# Patient Record
Sex: FEMALE | Race: WHITE | NOT HISPANIC OR LATINO | Employment: OTHER | URBAN - METROPOLITAN AREA
[De-identification: names, ages, dates, MRNs, and addresses within clinical notes are randomized per-mention and may not be internally consistent; named-entity substitution may affect disease eponyms.]

---

## 2020-03-26 ENCOUNTER — HOSPITAL ENCOUNTER (INPATIENT)
Facility: HOSPITAL | Age: 85
LOS: 4 days | Discharge: NURSING, REHAB OR LONG-TERM CARE | DRG: 291 | End: 2020-03-30
Attending: FAMILY MEDICINE | Admitting: FAMILY MEDICINE
Payer: MEDICARE

## 2020-03-26 DIAGNOSIS — I50.9 ACUTE CHF (CONGESTIVE HEART FAILURE): ICD-10-CM

## 2020-03-26 DIAGNOSIS — R06.02 SOB (SHORTNESS OF BREATH): ICD-10-CM

## 2020-03-26 DIAGNOSIS — I50.9 ACUTE ON CHRONIC CONGESTIVE HEART FAILURE, UNSPECIFIED HEART FAILURE TYPE: ICD-10-CM

## 2020-03-26 DIAGNOSIS — J18.9 PNEUMONIA OF RIGHT LOWER LOBE DUE TO INFECTIOUS ORGANISM: Primary | ICD-10-CM

## 2020-03-26 PROBLEM — E87.1 HYPONATREMIA: Status: ACTIVE | Noted: 2020-03-26

## 2020-03-26 PROBLEM — E87.5 HYPERKALEMIA: Status: ACTIVE | Noted: 2020-03-26

## 2020-03-26 PROBLEM — R79.89 ELEVATED SERUM CREATININE: Status: ACTIVE | Noted: 2020-03-26

## 2020-03-26 PROBLEM — J96.01 ACUTE HYPOXEMIC RESPIRATORY FAILURE: Status: ACTIVE | Noted: 2020-03-26

## 2020-03-26 LAB
ALBUMIN SERPL BCP-MCNC: 3.9 G/DL (ref 3.5–5.2)
ALP SERPL-CCNC: 85 U/L (ref 55–135)
ALT SERPL W/O P-5'-P-CCNC: 40 U/L (ref 10–44)
ANION GAP SERPL CALC-SCNC: 14 MMOL/L (ref 8–16)
AST SERPL-CCNC: 57 U/L (ref 10–40)
BASOPHILS # BLD AUTO: 0.03 K/UL (ref 0–0.2)
BASOPHILS NFR BLD: 0.4 % (ref 0–1.9)
BILIRUB SERPL-MCNC: 1 MG/DL (ref 0.1–1)
BNP SERPL-MCNC: 2945 PG/ML (ref 0–99)
BUN SERPL-MCNC: 32 MG/DL (ref 10–30)
CALCIUM SERPL-MCNC: 8.8 MG/DL (ref 8.7–10.5)
CHLORIDE SERPL-SCNC: 96 MMOL/L (ref 95–110)
CO2 SERPL-SCNC: 20 MMOL/L (ref 23–29)
CREAT SERPL-MCNC: 1.6 MG/DL (ref 0.5–1.4)
DIFFERENTIAL METHOD: ABNORMAL
EOSINOPHIL # BLD AUTO: 0 K/UL (ref 0–0.5)
EOSINOPHIL NFR BLD: 0.1 % (ref 0–8)
ERYTHROCYTE [DISTWIDTH] IN BLOOD BY AUTOMATED COUNT: 17.2 % (ref 11.5–14.5)
EST. GFR  (AFRICAN AMERICAN): 31.6 ML/MIN/1.73 M^2
EST. GFR  (NON AFRICAN AMERICAN): 27.4 ML/MIN/1.73 M^2
GLUCOSE SERPL-MCNC: 138 MG/DL (ref 70–110)
HCT VFR BLD AUTO: 38.2 % (ref 37–48.5)
HGB BLD-MCNC: 12.2 G/DL (ref 12–16)
IMM GRANULOCYTES # BLD AUTO: 0.03 K/UL (ref 0–0.04)
IMM GRANULOCYTES NFR BLD AUTO: 0.4 % (ref 0–0.5)
INFLUENZA A, MOLECULAR: NEGATIVE
INFLUENZA B, MOLECULAR: NEGATIVE
LYMPHOCYTES # BLD AUTO: 1.1 K/UL (ref 1–4.8)
LYMPHOCYTES NFR BLD: 14.2 % (ref 18–48)
MCH RBC QN AUTO: 28.6 PG (ref 27–31)
MCHC RBC AUTO-ENTMCNC: 31.9 G/DL (ref 32–36)
MCV RBC AUTO: 90 FL (ref 82–98)
MONOCYTES # BLD AUTO: 0.5 K/UL (ref 0.3–1)
MONOCYTES NFR BLD: 6.2 % (ref 4–15)
NEUTROPHILS # BLD AUTO: 5.9 K/UL (ref 1.8–7.7)
NEUTROPHILS NFR BLD: 78.7 % (ref 38–73)
NRBC BLD-RTO: 0 /100 WBC
PLATELET # BLD AUTO: 152 K/UL (ref 150–350)
PMV BLD AUTO: 12.1 FL (ref 9.2–12.9)
POTASSIUM SERPL-SCNC: 5.7 MMOL/L (ref 3.5–5.1)
PROT SERPL-MCNC: 6.4 G/DL (ref 6–8.4)
RBC # BLD AUTO: 4.26 M/UL (ref 4–5.4)
SODIUM SERPL-SCNC: 130 MMOL/L (ref 136–145)
SPECIMEN SOURCE: NORMAL
WBC # BLD AUTO: 7.47 K/UL (ref 3.9–12.7)

## 2020-03-26 PROCEDURE — 71045 X-RAY EXAM CHEST 1 VIEW: CPT | Mod: 26,,, | Performed by: RADIOLOGY

## 2020-03-26 PROCEDURE — 36415 COLL VENOUS BLD VENIPUNCTURE: CPT

## 2020-03-26 PROCEDURE — 99222 1ST HOSP IP/OBS MODERATE 55: CPT | Mod: AI,,, | Performed by: FAMILY MEDICINE

## 2020-03-26 PROCEDURE — 96360 HYDRATION IV INFUSION INIT: CPT

## 2020-03-26 PROCEDURE — U0002 COVID-19 LAB TEST NON-CDC: HCPCS

## 2020-03-26 PROCEDURE — 71045 X-RAY EXAM CHEST 1 VIEW: CPT | Mod: TC,FY

## 2020-03-26 PROCEDURE — 93010 EKG 12-LEAD: ICD-10-PCS | Mod: ,,, | Performed by: INTERNAL MEDICINE

## 2020-03-26 PROCEDURE — 63600175 PHARM REV CODE 636 W HCPCS: Performed by: FAMILY MEDICINE

## 2020-03-26 PROCEDURE — 85025 COMPLETE CBC W/AUTO DIFF WBC: CPT

## 2020-03-26 PROCEDURE — 99222 PR INITIAL HOSPITAL CARE,LEVL II: ICD-10-PCS | Mod: AI,,, | Performed by: FAMILY MEDICINE

## 2020-03-26 PROCEDURE — 93005 ELECTROCARDIOGRAM TRACING: CPT

## 2020-03-26 PROCEDURE — 83880 ASSAY OF NATRIURETIC PEPTIDE: CPT

## 2020-03-26 PROCEDURE — 21400001 HC TELEMETRY ROOM

## 2020-03-26 PROCEDURE — 80053 COMPREHEN METABOLIC PANEL: CPT

## 2020-03-26 PROCEDURE — 11000001 HC ACUTE MED/SURG PRIVATE ROOM

## 2020-03-26 PROCEDURE — 93010 ELECTROCARDIOGRAM REPORT: CPT | Mod: ,,, | Performed by: INTERNAL MEDICINE

## 2020-03-26 PROCEDURE — 99285 EMERGENCY DEPT VISIT HI MDM: CPT | Mod: 25

## 2020-03-26 PROCEDURE — 87502 INFLUENZA DNA AMP PROBE: CPT

## 2020-03-26 PROCEDURE — 71045 XR CHEST AP PORTABLE: ICD-10-PCS | Mod: 26,,, | Performed by: RADIOLOGY

## 2020-03-26 RX ORDER — FUROSEMIDE 20 MG/1
20 TABLET ORAL
Status: ON HOLD | COMMUNITY
End: 2020-03-30 | Stop reason: SDUPTHER

## 2020-03-26 RX ORDER — ATORVASTATIN CALCIUM 40 MG/1
40 TABLET, FILM COATED ORAL DAILY
COMMUNITY

## 2020-03-26 RX ORDER — ATENOLOL 25 MG/1
25 TABLET ORAL DAILY
COMMUNITY

## 2020-03-26 RX ORDER — MORPHINE SULFATE 4 MG/ML
1 INJECTION, SOLUTION INTRAMUSCULAR; INTRAVENOUS EVERY 4 HOURS PRN
Status: DISCONTINUED | OUTPATIENT
Start: 2020-03-26 | End: 2020-03-30 | Stop reason: HOSPADM

## 2020-03-26 RX ORDER — ONDANSETRON 2 MG/ML
4 INJECTION INTRAMUSCULAR; INTRAVENOUS
Status: COMPLETED | OUTPATIENT
Start: 2020-03-26 | End: 2020-03-26

## 2020-03-26 RX ORDER — ACETAMINOPHEN 325 MG/1
650 TABLET ORAL EVERY 4 HOURS PRN
Status: DISCONTINUED | OUTPATIENT
Start: 2020-03-26 | End: 2020-03-30 | Stop reason: HOSPADM

## 2020-03-26 RX ORDER — SODIUM CHLORIDE 0.9 % (FLUSH) 0.9 %
10 SYRINGE (ML) INJECTION
Status: DISCONTINUED | OUTPATIENT
Start: 2020-03-26 | End: 2020-03-30 | Stop reason: HOSPADM

## 2020-03-26 RX ORDER — ISOSORBIDE MONONITRATE 30 MG/1
15 TABLET, EXTENDED RELEASE ORAL DAILY
COMMUNITY

## 2020-03-26 RX ORDER — POTASSIUM CHLORIDE 750 MG/1
10 TABLET, EXTENDED RELEASE ORAL ONCE
COMMUNITY

## 2020-03-26 RX ORDER — ONDANSETRON 2 MG/ML
4 INJECTION INTRAMUSCULAR; INTRAVENOUS EVERY 8 HOURS PRN
Status: DISCONTINUED | OUTPATIENT
Start: 2020-03-26 | End: 2020-03-30 | Stop reason: HOSPADM

## 2020-03-26 RX ORDER — CITALOPRAM 10 MG/1
10 TABLET ORAL DAILY
COMMUNITY

## 2020-03-26 RX ORDER — OMEPRAZOLE 20 MG/1
20 CAPSULE, DELAYED RELEASE ORAL DAILY
COMMUNITY

## 2020-03-26 RX ORDER — LOSARTAN POTASSIUM 50 MG/1
50 TABLET ORAL DAILY
COMMUNITY

## 2020-03-26 RX ORDER — SODIUM CHLORIDE 9 MG/ML
1000 INJECTION, SOLUTION INTRAVENOUS
Status: COMPLETED | OUTPATIENT
Start: 2020-03-26 | End: 2020-03-26

## 2020-03-26 RX ORDER — FUROSEMIDE 10 MG/ML
60 INJECTION INTRAMUSCULAR; INTRAVENOUS
Status: COMPLETED | OUTPATIENT
Start: 2020-03-26 | End: 2020-03-26

## 2020-03-26 RX ORDER — ONDANSETRON 4 MG/1
4 TABLET, ORALLY DISINTEGRATING ORAL EVERY 6 HOURS PRN
Status: DISCONTINUED | OUTPATIENT
Start: 2020-03-26 | End: 2020-03-30 | Stop reason: HOSPADM

## 2020-03-26 RX ORDER — MONTELUKAST SODIUM 10 MG/1
10 TABLET ORAL NIGHTLY
COMMUNITY

## 2020-03-26 RX ORDER — LEVOTHYROXINE SODIUM 75 UG/1
75 TABLET ORAL
COMMUNITY

## 2020-03-26 RX ORDER — AMLODIPINE BESYLATE 2.5 MG/1
2.5 TABLET ORAL DAILY
COMMUNITY

## 2020-03-26 RX ORDER — FUROSEMIDE 10 MG/ML
40 INJECTION INTRAMUSCULAR; INTRAVENOUS DAILY
Status: DISCONTINUED | OUTPATIENT
Start: 2020-03-27 | End: 2020-03-30 | Stop reason: HOSPADM

## 2020-03-26 RX ORDER — HEPARIN SODIUM 5000 [USP'U]/ML
5000 INJECTION, SOLUTION INTRAVENOUS; SUBCUTANEOUS EVERY 8 HOURS
Status: DISCONTINUED | OUTPATIENT
Start: 2020-03-26 | End: 2020-03-30 | Stop reason: HOSPADM

## 2020-03-26 RX ORDER — LORATADINE 10 MG/1
10 TABLET ORAL DAILY
COMMUNITY

## 2020-03-26 RX ADMIN — CEFTRIAXONE SODIUM 1 G: 1 INJECTION, POWDER, FOR SOLUTION INTRAMUSCULAR; INTRAVENOUS at 08:03

## 2020-03-26 RX ADMIN — HEPARIN SODIUM 5000 UNITS: 5000 INJECTION, SOLUTION INTRAVENOUS; SUBCUTANEOUS at 11:03

## 2020-03-26 RX ADMIN — FUROSEMIDE 60 MG: 10 INJECTION, SOLUTION INTRAMUSCULAR; INTRAVENOUS at 05:03

## 2020-03-26 RX ADMIN — ONDANSETRON 4 MG: 2 INJECTION INTRAMUSCULAR; INTRAVENOUS at 05:03

## 2020-03-26 RX ADMIN — AZITHROMYCIN MONOHYDRATE 500 MG: 500 INJECTION, POWDER, LYOPHILIZED, FOR SOLUTION INTRAVENOUS at 05:03

## 2020-03-26 RX ADMIN — SODIUM CHLORIDE 1000 ML: 0.9 INJECTION, SOLUTION INTRAVENOUS at 04:03

## 2020-03-26 NOTE — ED NOTES
While in room, charge nurse Jake called using [facility], informed that I cannot  a pulse ox after multiple attempt, to ask dr. Rudolph for an ABG, charge nurse jake verbalized understanding

## 2020-03-26 NOTE — ASSESSMENT & PLAN NOTE
R/o COVID  Zithromax 500 mg IV + Rocephin 1g q24h  WBC wnl  Clinically c/w decompensated heart failure, will diuresis and monitor O2 sats  Consider changing abx in AM if worsening vitals or she develops fever overnight  Holding IVFs at this time but will monitor BP and start if hypotension develops  Hold steroids at this time in setting of possible COVID

## 2020-03-26 NOTE — ASSESSMENT & PLAN NOTE
IV lasix 40 mg daily, consider increasing based on clinical appearance and CXR  BNP near 3000  Strict I/O  Daily weights   Continuous O2 monitoring

## 2020-03-26 NOTE — HPI
Patient is a 89-year-old female with unknown past medical history presents to the ER from nursing home with worsening shortness of breath, hypoxia.  On arrival in the ER O2 sat room the 70s, mottled skin appearance with lower extremity edema.  White blood cell count within normal limits.  Chest x-ray with possible pleural effusion versus bilateral patchy infiltrates.  Patient is oriented to name.  In the ER she was given IV Lasix 60, tested for COVID 19, started on continuous O2 by face mask, given Zithromax 500 mg IV. Hospital called for admission for acute respiratory failure with hypoxia.

## 2020-03-26 NOTE — ED PROVIDER NOTES
Encounter Date: 3/26/2020       History     Chief Complaint   Patient presents with    Joint Swelling     bilateral ankle    Shortness of Breath    Altered Mental Status     89-year-old female presents complaining of shortness of breath joint swelling she was transferred from local nursing home in respiratory distress there is a an elevated incidence of COVID-19 in the area but no confirmed cases at her nursing home yet, the patient is not very verbal but has pre-existing at DNR orders        Review of patient's allergies indicates:   Allergen Reactions    Antisera     Diphtheria toxin preparations     Horse/equine containing products     Pertussis vaccines     Sulfa (sulfonamide antibiotics)     Tetanus vaccines and toxoid      No past medical history on file.  No past surgical history on file.  No family history on file.  Social History     Tobacco Use    Smoking status: Not on file   Substance Use Topics    Alcohol use: Not on file    Drug use: Not on file     Review of Systems   Constitutional: Negative for fever.   HENT: Negative for sore throat.    Respiratory: Positive for cough. Negative for shortness of breath and wheezing.    Cardiovascular: Negative for chest pain.   Gastrointestinal: Negative for nausea.   Genitourinary: Negative for dysuria.   Musculoskeletal: Negative for back pain.   Skin: Negative for rash.   Neurological: Negative for weakness.   Hematological: Does not bruise/bleed easily.       Physical Exam     Initial Vitals [03/26/20 1457]   BP Pulse Resp Temp SpO2   (!) 148/80 -- -- 98.3 °F (36.8 °C) --      MAP       --         Physical Exam    Nursing note and vitals reviewed.  Constitutional: She appears well-developed and well-nourished. She is not diaphoretic. No distress.   HENT:   Head: Normocephalic and atraumatic.   Right Ear: External ear normal.   Left Ear: External ear normal.   Eyes: Pupils are equal, round, and reactive to light. Right eye exhibits no discharge. Left  eye exhibits no discharge.   Neck: No tracheal deviation present. No JVD present.   Cardiovascular: Exam reveals no friction rub.    No murmur heard.  Pulmonary/Chest: No stridor. She is in respiratory distress. She has wheezes. She has no rales.   Abdominal: Bowel sounds are normal. She exhibits no distension.   Musculoskeletal: Normal range of motion. She exhibits edema.   Neurological: She is alert.   Skin: Skin is warm.   Psychiatric: She has a normal mood and affect.         ED Course   Procedures  Labs Reviewed   CBC W/ AUTO DIFFERENTIAL   COMPREHENSIVE METABOLIC PANEL     EKG Readings: (Independently Interpreted)   Rhythm: Atrial Fibrillation. Heart Rate: 101. Ectopy: No Ectopy. Conduction: Normal. ST Segments: Normal ST Segments. T Waves: Normal.       Imaging Results    None          Medical Decision Making:   ED Management:  Though it has not been confirmed it is very possible the patient has COVID-19, case was discussed with the patient who agrees to transfer also with Dr. Olea the hospitalist, will continue oxygen support IV is at her my since and supportive care patient is DNR                                 Clinical Impression:       ICD-10-CM ICD-9-CM   1. Pneumonia of right lower lobe due to infectious organism J18.1 486   2. SOB (shortness of breath) R06.02 786.05   3. Acute on chronic congestive heart failure, unspecified heart failure type I50.9 428.0                                Robert Rudolph MD  03/26/20 1744       Robert Rudolph MD  04/06/20 [id number]

## 2020-03-26 NOTE — ED TRIAGE NOTES
PT to er room 6 via [facility]. EMS reports that [facility] noticed increased swelling to her ankles and feet and coldness to both lower extremities. EMS reports pt received IV lasix at NH per NP order. Pt cyanotic to bilateral fingers, c/o increasing SOB, and anxiety.

## 2020-03-26 NOTE — ED NOTES
My attempts to obtain pulse ox are unsuccessful, attempt made to R ear, to L ear x2, frontal, L 4th& 5 digit, R 1st, 2nd and 3rd digit, tips to finger purple blue

## 2020-03-27 LAB
ALBUMIN SERPL BCP-MCNC: 3.6 G/DL (ref 3.5–5.2)
ALP SERPL-CCNC: 73 U/L (ref 55–135)
ALT SERPL W/O P-5'-P-CCNC: 71 U/L (ref 10–44)
ANION GAP SERPL CALC-SCNC: 13 MMOL/L (ref 8–16)
AST SERPL-CCNC: 96 U/L (ref 10–40)
BASOPHILS # BLD AUTO: 0.01 K/UL (ref 0–0.2)
BASOPHILS NFR BLD: 0.1 % (ref 0–1.9)
BILIRUB SERPL-MCNC: 0.9 MG/DL (ref 0.1–1)
BUN SERPL-MCNC: 35 MG/DL (ref 10–30)
CALCIUM SERPL-MCNC: 8.9 MG/DL (ref 8.7–10.5)
CHLORIDE SERPL-SCNC: 95 MMOL/L (ref 95–110)
CO2 SERPL-SCNC: 23 MMOL/L (ref 23–29)
CREAT SERPL-MCNC: 1.8 MG/DL (ref 0.5–1.4)
DIFFERENTIAL METHOD: ABNORMAL
EOSINOPHIL # BLD AUTO: 0 K/UL (ref 0–0.5)
EOSINOPHIL NFR BLD: 0 % (ref 0–8)
ERYTHROCYTE [DISTWIDTH] IN BLOOD BY AUTOMATED COUNT: 17 % (ref 11.5–14.5)
EST. GFR  (AFRICAN AMERICAN): 27.4 ML/MIN/1.73 M^2
EST. GFR  (NON AFRICAN AMERICAN): 23.8 ML/MIN/1.73 M^2
GLUCOSE SERPL-MCNC: 126 MG/DL (ref 70–110)
HCT VFR BLD AUTO: 35.9 % (ref 37–48.5)
HGB BLD-MCNC: 11.6 G/DL (ref 12–16)
IMM GRANULOCYTES # BLD AUTO: 0.02 K/UL (ref 0–0.04)
IMM GRANULOCYTES NFR BLD AUTO: 0.3 % (ref 0–0.5)
LYMPHOCYTES # BLD AUTO: 1 K/UL (ref 1–4.8)
LYMPHOCYTES NFR BLD: 14.4 % (ref 18–48)
MAGNESIUM SERPL-MCNC: 2 MG/DL (ref 1.6–2.6)
MCH RBC QN AUTO: 28.6 PG (ref 27–31)
MCHC RBC AUTO-ENTMCNC: 32.3 G/DL (ref 32–36)
MCV RBC AUTO: 88 FL (ref 82–98)
MONOCYTES # BLD AUTO: 0.7 K/UL (ref 0.3–1)
MONOCYTES NFR BLD: 10.1 % (ref 4–15)
NEUTROPHILS # BLD AUTO: 5.1 K/UL (ref 1.8–7.7)
NEUTROPHILS NFR BLD: 75.1 % (ref 38–73)
NRBC BLD-RTO: 0 /100 WBC
PHOSPHATE SERPL-MCNC: 4.8 MG/DL (ref 2.7–4.5)
PLATELET # BLD AUTO: 138 K/UL (ref 150–350)
PMV BLD AUTO: 12.6 FL (ref 9.2–12.9)
POTASSIUM SERPL-SCNC: 5.1 MMOL/L (ref 3.5–5.1)
PROT SERPL-MCNC: 5.9 G/DL (ref 6–8.4)
RBC # BLD AUTO: 4.06 M/UL (ref 4–5.4)
SODIUM SERPL-SCNC: 131 MMOL/L (ref 136–145)
WBC # BLD AUTO: 6.8 K/UL (ref 3.9–12.7)

## 2020-03-27 PROCEDURE — 99232 SBSQ HOSP IP/OBS MODERATE 35: CPT | Mod: ,,, | Performed by: FAMILY MEDICINE

## 2020-03-27 PROCEDURE — 63600175 PHARM REV CODE 636 W HCPCS: Performed by: FAMILY MEDICINE

## 2020-03-27 PROCEDURE — 80053 COMPREHEN METABOLIC PANEL: CPT

## 2020-03-27 PROCEDURE — 84100 ASSAY OF PHOSPHORUS: CPT

## 2020-03-27 PROCEDURE — 85025 COMPLETE CBC W/AUTO DIFF WBC: CPT

## 2020-03-27 PROCEDURE — 27000221 HC OXYGEN, UP TO 24 HOURS

## 2020-03-27 PROCEDURE — 83735 ASSAY OF MAGNESIUM: CPT

## 2020-03-27 PROCEDURE — 94760 N-INVAS EAR/PLS OXIMETRY 1: CPT

## 2020-03-27 PROCEDURE — 21400001 HC TELEMETRY ROOM

## 2020-03-27 PROCEDURE — 99232 PR SUBSEQUENT HOSPITAL CARE,LEVL II: ICD-10-PCS | Mod: ,,, | Performed by: FAMILY MEDICINE

## 2020-03-27 RX ORDER — SODIUM CHLORIDE 9 MG/ML
INJECTION, SOLUTION INTRAVENOUS CONTINUOUS
Status: DISCONTINUED | OUTPATIENT
Start: 2020-03-27 | End: 2020-03-30 | Stop reason: HOSPADM

## 2020-03-27 RX ADMIN — HEPARIN SODIUM 5000 UNITS: 5000 INJECTION, SOLUTION INTRAVENOUS; SUBCUTANEOUS at 09:03

## 2020-03-27 RX ADMIN — HEPARIN SODIUM 5000 UNITS: 5000 INJECTION, SOLUTION INTRAVENOUS; SUBCUTANEOUS at 06:03

## 2020-03-27 RX ADMIN — CEFTRIAXONE SODIUM 1 G: 1 INJECTION, POWDER, FOR SOLUTION INTRAMUSCULAR; INTRAVENOUS at 07:03

## 2020-03-27 RX ADMIN — SODIUM CHLORIDE 1000 ML: 0.9 INJECTION, SOLUTION INTRAVENOUS at 10:03

## 2020-03-27 RX ADMIN — FUROSEMIDE 40 MG: 10 INJECTION, SOLUTION INTRAMUSCULAR; INTRAVENOUS at 10:03

## 2020-03-27 RX ADMIN — AZITHROMYCIN DIHYDRATE 500 MG: 500 INJECTION, POWDER, LYOPHILIZED, FOR SOLUTION INTRAVENOUS at 08:03

## 2020-03-27 RX ADMIN — HEPARIN SODIUM 5000 UNITS: 5000 INJECTION, SOLUTION INTRAVENOUS; SUBCUTANEOUS at 01:03

## 2020-03-27 NOTE — NURSING
AFTER EXITING ROOM, INFORMED PER BANDAR THAT PT. HAD SAT UP ON THE SIDE OF THE BED. RE-ENTERED ROOM, PT. SITTING UP. BLOOD NOTED ON SHEET. SKIN TEAR NOTED TO R ELBOW, APPROX. DIME SIZED. TRANSPARENT DRESSING APPLIED. PT. REORIENTED TO TIME AND PLACE. ASSISTED BACK TO LYING POSITION. BED ALARM IN USE. SIDE RAILS UP.

## 2020-03-27 NOTE — PLAN OF CARE
03/27/20 1455   Medicare Message   Important Message from Medicare regarding Discharge Appeal Rights Given to patient/caregiver;Explained to patient/caregiver;Signed/date by patient/caregiver   Date IMM was signed 03/27/20   Time IMM was signed 1430   PT'S DAUGHTER, LISA SMILEY, GAVE VERBAL CONSENT FOR SW TO SIGN IMM FOR PT. WITNESS; CRONELIO GRIMES RN.

## 2020-03-27 NOTE — SUBJECTIVE & OBJECTIVE
Interval History: Mild clinical improvement    Review of Systems   Unable to perform ROS: Other (patient is alert but does not respond to questions/exam)   Constitutional: Negative for fever.   Respiratory: Positive for shortness of breath.    Genitourinary: Negative.    Neurological: Positive for weakness.     Objective:     Vital Signs (Most Recent):  Temp: 99.2 °F (37.3 °C) (03/27/20 1101)  Pulse: 78 (03/27/20 1101)  Resp: 18 (03/27/20 1101)  BP: (!) 135/54 (03/27/20 1101)  SpO2: (unable to obtain o2 sat level per multiple sites, MD aware) (03/27/20 1101) Vital Signs (24h Range):  Temp:  [97.8 °F (36.6 °C)-100.1 °F (37.8 °C)] 99.2 °F (37.3 °C)  Pulse:  [] 78  Resp:  [] 18  SpO2:  [71 %-100 %] 100 %  BP: (100-154)/() 135/54     Weight: 55.9 kg (123 lb 3.2 oz)  Body mass index is 21.15 kg/m².    Intake/Output Summary (Last 24 hours) at 3/27/2020 1203  Last data filed at 3/27/2020 0830  Gross per 24 hour   Intake 170 ml   Output --   Net 170 ml      Physical Exam   Constitutional:   Thin female, elderly, does not respond to questions but will respond to commands with assistance   HENT:   Head: Normocephalic and atraumatic.   Right Ear: External ear normal.   Left Ear: External ear normal.   Dry mucous membrane   Eyes: Conjunctivae are normal.   Cardiovascular: Normal rate, regular rhythm and intact distal pulses.   Murmur heard.  Pulmonary/Chest: She is in respiratory distress (moderate). She has no wheezes.   Increased work of breathing though improved from admission, bibasilar crackles are present   Abdominal: Bowel sounds are normal. She exhibits no distension.   Skin: Skin is warm. Capillary refill takes 2 to 3 seconds.   Vitals reviewed.      Significant Labs:   Recent Lab Results       03/27/20  0545   03/26/20  1549   03/26/20  1548        Influenza A, Molecular     Negative     Influenza B, Molecular     Negative     Albumin 3.6 3.9       Alkaline Phosphatase 73 85       ALT 71 40        Anion Gap 13 14       AST 96 57       Baso # 0.01 0.03       Basophil% 0.1 0.4       BILIRUBIN TOTAL 0.9  Comment:  For infants and newborns, interpretation of results should be based  on gestational age, weight and in agreement with clinical  observations.  Premature Infant recommended reference ranges:  Up to 24 hours.............<8.0 mg/dL  Up to 48 hours............<12.0 mg/dL  3-5 days..................<15.0 mg/dL  6-29 days.................<15.0 mg/dL   1.0  Comment:  For infants and newborns, interpretation of results should be based  on gestational age, weight and in agreement with clinical  observations.  Premature Infant recommended reference ranges:  Up to 24 hours.............<8.0 mg/dL  Up to 48 hours............<12.0 mg/dL  3-5 days..................<15.0 mg/dL  6-29 days.................<15.0 mg/dL         BNP   2,945  Comment:  Values of less than 100 pg/ml are consistent with non-CHF populations.       BUN, Bld 35 32       Calcium 8.9 8.8       Chloride 95 96       CO2 23 20       Creatinine 1.8 1.6       Differential Method Automated Automated       eGFR if  27.4 31.6       eGFR if non  23.8  Comment:  Calculation used to obtain the estimated glomerular filtration  rate (eGFR) is the CKD-EPI equation.    27.4  Comment:  Calculation used to obtain the estimated glomerular filtration  rate (eGFR) is the CKD-EPI equation.          Eos # 0.0 0.0       Eosinophil% 0.0 0.1       Flu A & B Source     Nasal Swab     Glucose 126 138       Gran # (ANC) 5.1 5.9       Gran% 75.1 78.7       Hematocrit 35.9 38.2       Hemoglobin 11.6 12.2       Immature Grans (Abs) 0.02  Comment:  Mild elevation in immature granulocytes is non specific and   can be seen in a variety of conditions including stress response,   acute inflammation, trauma and pregnancy. Correlation with other   laboratory and clinical findings is essential.   0.03  Comment:  Mild elevation in immature granulocytes is  non specific and   can be seen in a variety of conditions including stress response,   acute inflammation, trauma and pregnancy. Correlation with other   laboratory and clinical findings is essential.         Immature Granulocytes 0.3 0.4       Lymph # 1.0 1.1       Lymph% 14.4 14.2       Magnesium 2.0         MCH 28.6 28.6       MCHC 32.3 31.9       MCV 88 90       Mono # 0.7 0.5       Mono% 10.1 6.2       MPV 12.6 12.1       nRBC 0 0       Phosphorus 4.8         Platelets 138 152       Potassium 5.1 5.7       PROTEIN TOTAL 5.9 6.4       RBC 4.06 4.26       RDW 17.0 17.2       Sodium 131 130       WBC 6.80 7.47           All pertinent labs within the past 24 hours have been reviewed.    Significant Imaging: I have reviewed and interpreted all pertinent imaging results/findings within the past 24 hours.

## 2020-03-27 NOTE — ASSESSMENT & PLAN NOTE
IV lasix 40 mg daily, consider increasing based on clinical appearance and CXR  BNP near 3000  Strict I/O  Daily weights   Continuous O2 monitoring    3/26/20  Continue on NRB O2, wean as tolerated  IV lasix 40 mg daily, may need to increase frequency  Trend BNP  Strict I/O  Started gentle intravascular hydration, monitor closely for fluid overload    3/27/20  Echo ordered but will hold until COVID testing results unless imaging becomes emergent  Continue IV fluids at low rate  Daily lasix  Strict I/O

## 2020-03-27 NOTE — ASSESSMENT & PLAN NOTE
R/o COVID  Zithromax 500 mg IV + Rocephin 1g q24h  WBC wnl  Clinically c/w decompensated heart failure, will diuresis and monitor O2 sats  Consider changing abx in AM if worsening vitals or she develops fever overnight  Holding IVFs at this time but will monitor BP and start if hypotension develops  Hold steroids at this time in setting of possible COVID    3/26/20  COVID pending  Continue current antibiotics  WBC remains wnl    3/27/20  Difficulty obtaining oxygen saturation readings, will monitor clinically.   Continue antibiotics

## 2020-03-27 NOTE — PROGRESS NOTES
[facility] Hospital Medicine  Progress Note    Patient Name: Bebe Gomez  MRN: [id number]  Patient Class: IP- Inpatient   Admission Date: 3/26/2020  Length of Stay: 1 days  Attending Physician: Jahaira Olea MD  Primary Care Provider: José Miguel Acuna Ii, MD        Subjective:     Principal Problem:Acute on chronic congestive heart failure        HPI:  Patient is a 89-year-old female with unknown past medical history presents to the ER from nursing home with worsening shortness of breath, hypoxia.  On arrival in the ER O2 sat room the 70s, mottled skin appearance with lower extremity edema.  White blood cell count within normal limits.  Chest x-ray with possible pleural effusion versus bilateral patchy infiltrates.  Patient is oriented to name.  In the ER she was given IV Lasix 60, tested for COVID 19, started on continuous O2 by face mask, given Zithromax 500 mg IV. Hospital called for admission for acute respiratory failure with hypoxia.     Overview/Hospital Course:  Remained on nonrebreather overnight. Improving clinically. BNP pending. Respiratory rate improving. VSS. IVFs started at low rate for gentle hydration. Continue lasix daily.     Interval History: Mild clinical improvement    Review of Systems   Unable to perform ROS: Other (patient is alert but does not respond to questions/exam)   Constitutional: Negative for fever.   Respiratory: Positive for shortness of breath.    Genitourinary: Negative.    Neurological: Positive for weakness.     Objective:     Vital Signs (Most Recent):  Temp: 99.2 °F (37.3 °C) (03/27/20 1101)  Pulse: 78 (03/27/20 1101)  Resp: 18 (03/27/20 1101)  BP: (!) 135/54 (03/27/20 1101)  SpO2: (unable to obtain o2 sat level per multiple sites, MD aware) (03/27/20 1101) Vital Signs (24h Range):  Temp:  [97.8 °F (36.6 °C)-100.1 °F (37.8 °C)] 99.2 °F (37.3 °C)  Pulse:  [] 78  Resp:  [] 18  SpO2:  [71 %-100 %] 100 %  BP:  (100-154)/() 135/54     Weight: 55.9 kg (123 lb 3.2 oz)  Body mass index is 21.15 kg/m².    Intake/Output Summary (Last 24 hours) at 3/27/2020 1203  Last data filed at 3/27/2020 0830  Gross per 24 hour   Intake 170 ml   Output --   Net 170 ml      Physical Exam   Constitutional:   Thin female, elderly, does not respond to questions but will respond to commands with assistance   HENT:   Head: Normocephalic and atraumatic.   Right Ear: External ear normal.   Left Ear: External ear normal.   Dry mucous membrane   Eyes: Conjunctivae are normal.   Cardiovascular: Normal rate, regular rhythm and intact distal pulses.   Murmur heard.  Pulmonary/Chest: She is in respiratory distress (moderate). She has no wheezes.   Increased work of breathing though improved from admission, bibasilar crackles are present   Abdominal: Bowel sounds are normal. She exhibits no distension.   Skin: Skin is warm. Capillary refill takes 2 to 3 seconds.   Vitals reviewed.      Significant Labs:   Recent Lab Results       03/27/20  0545   03/26/20  1549   03/26/20  1548        Influenza A, Molecular     Negative     Influenza B, Molecular     Negative     Albumin 3.6 3.9       Alkaline Phosphatase 73 85       ALT 71 40       Anion Gap 13 14       AST 96 57       Baso # 0.01 0.03       Basophil% 0.1 0.4       BILIRUBIN TOTAL 0.9  Comment:  For infants and newborns, interpretation of results should be based  on gestational age, weight and in agreement with clinical  observations.  Premature Infant recommended reference ranges:  Up to 24 hours.............<8.0 mg/dL  Up to 48 hours............<12.0 mg/dL  3-5 days..................<15.0 mg/dL  6-29 days.................<15.0 mg/dL   1.0  Comment:  For infants and newborns, interpretation of results should be based  on gestational age, weight and in agreement with clinical  observations.  Premature Infant recommended reference ranges:  Up to 24 hours.............<8.0 mg/dL  Up to 48  hours............<12.0 mg/dL  3-5 days..................<15.0 mg/dL  6-29 days.................<15.0 mg/dL         BNP   2,945  Comment:  Values of less than 100 pg/ml are consistent with non-CHF populations.       BUN, Bld 35 32       Calcium 8.9 8.8       Chloride 95 96       CO2 23 20       Creatinine 1.8 1.6       Differential Method Automated Automated       eGFR if  27.4 31.6       eGFR if non  23.8  Comment:  Calculation used to obtain the estimated glomerular filtration  rate (eGFR) is the CKD-EPI equation.    27.4  Comment:  Calculation used to obtain the estimated glomerular filtration  rate (eGFR) is the CKD-EPI equation.          Eos # 0.0 0.0       Eosinophil% 0.0 0.1       Flu A & B Source     Nasal Swab     Glucose 126 138       Gran # (ANC) 5.1 5.9       Gran% 75.1 78.7       Hematocrit 35.9 38.2       Hemoglobin 11.6 12.2       Immature Grans (Abs) 0.02  Comment:  Mild elevation in immature granulocytes is non specific and   can be seen in a variety of conditions including stress response,   acute inflammation, trauma and pregnancy. Correlation with other   laboratory and clinical findings is essential.   0.03  Comment:  Mild elevation in immature granulocytes is non specific and   can be seen in a variety of conditions including stress response,   acute inflammation, trauma and pregnancy. Correlation with other   laboratory and clinical findings is essential.         Immature Granulocytes 0.3 0.4       Lymph # 1.0 1.1       Lymph% 14.4 14.2       Magnesium 2.0         MCH 28.6 28.6       MCHC 32.3 31.9       MCV 88 90       Mono # 0.7 0.5       Mono% 10.1 6.2       MPV 12.6 12.1       nRBC 0 0       Phosphorus 4.8         Platelets 138 152       Potassium 5.1 5.7       PROTEIN TOTAL 5.9 6.4       RBC 4.06 4.26       RDW 17.0 17.2       Sodium 131 130       WBC 6.80 7.47           All pertinent labs within the past 24 hours have been reviewed.    Significant Imaging:  I have reviewed and interpreted all pertinent imaging results/findings within the past 24 hours.      Assessment/Plan:      * Acute on chronic congestive heart failure  IV lasix 40 mg daily, consider increasing based on clinical appearance and CXR  BNP near 3000  Strict I/O  Daily weights   Continuous O2 monitoring    3/26/20  Continue on NRB O2, wean as tolerated  IV lasix 40 mg daily, may need to increase frequency  Trend BNP  Strict I/O  Started gentle intravascular hydration, monitor closely for fluid overload    3/27/20  Echo ordered but will hold until COVID testing results unless imaging becomes emergent  Continue IV fluids at low rate  Daily lasix  Strict I/O      SOB (shortness of breath)  Possibly 2/2 acute on chronic heart failure  Diuresis in progress, monitor output      Pneumonia of right lower lobe due to infectious organism  R/o COVID  Zithromax 500 mg IV + Rocephin 1g q24h  WBC wnl  Clinically c/w decompensated heart failure, will diuresis and monitor O2 sats  Consider changing abx in AM if worsening vitals or she develops fever overnight  Holding IVFs at this time but will monitor BP and start if hypotension develops  Hold steroids at this time in setting of possible COVID    3/26/20  COVID pending  Continue current antibiotics  WBC remains wnl    3/27/20  Difficulty obtaining oxygen saturation readings, will monitor clinically.   Continue antibiotics          VTE Risk Mitigation (From admission, onward)         Ordered     heparin (porcine) injection 5,000 Units  Every 8 hours      03/26/20 1852     IP VTE HIGH RISK PATIENT  Once      03/26/20 1852     Place JUANA hose  Until discontinued      03/26/20 1852                      Jahaira Olea MD  Department of Hospital Medicine   [facility]

## 2020-03-27 NOTE — PLAN OF CARE
03/27/20 1458   Discharge Assessment   Assessment Type Discharge Planning Assessment   Confirmed/corrected address and phone number on facesheet? Yes  (PT LIVES AT [facility], PERMANENT ADDRESS IS [address] [place] [address])   Assessment information obtained from? Other  (DAUGHTER)   Prior to hospitilization cognitive status: Alert/Oriented   Prior to hospitalization functional status: Wheelchair Bound   Current cognitive status: Unable to Assess   Current Functional Status: Completely Dependent   [facility]   Lives With facility resident   Able to Return to Prior Arrangements yes   Is patient able to care for self after discharge? No   Who are your caregiver(s) and their phone number(s)? [facility]AFF AND DAUGHTER WHEN NEEDED PROVIDES WHATEVER PT NEEDS   Patient's perception of discharge disposition nursing home   Readmission Within the Last 30 Days no previous admission in last 30 days   Patient currently being followed by outpatient case management? No   Patient currently receives any other outside agency services? No   Equipment Currently Used at Home wheelchair  (PT HAS ACCESS TO NURSING HOME EQUIPMENT AS NEEDED)   Do you have any problems affording any of your prescribed medications? No   Is the patient taking medications as prescribed? yes   Does the patient have transportation home? Yes   Transportation Anticipated other (see comments)  (AMBULANCE OR NH [facility] WILL TRANSPORT PT BACK TO NH.)   Dialysis Name and Scheduled days N/A   Does the patient receive services at the [facility]? No   Discharge Plan A Return to nursing home   DME Needed Upon Discharge  none   Patient/Family in Agreement with Plan yes   PT IS ADMITTED FROM LONG TERM CARE PROGRAM AT [facility] WHERE SHE HAS BEEN A RESIDENT FOR 7 YEARS.  Daughter answered discharge planning assessment questions for pt who is extremely hard of hearing. SHE SAYS SHE SPOKE WITH PT BY PHONE A FEW DAYS AGO  SINCE NH PTS CANNOT HAVE VISITORS NOW DUE TO THE CORONA VIRUS. PT WAS ASKING FOR PRUNES TO PREVENT CONSTIPATION. PT WILL BE 89 IN MAY. SW WILL FOLLOW AND ASSIST WITH PT'S RETURN TO NH.

## 2020-03-27 NOTE — SUBJECTIVE & OBJECTIVE
Past Medical History:   Diagnosis Date    Allergic rhinitis     Anxiety     Athscl heart disease of native coronary artery w/o ang pctrs     CHF (congestive heart failure)     Constipation     Depression     Dry eye     Hemorrhoids     Hyperlipemia     Hypothyroid     Muscle weakness        Past Surgical History:   Procedure Laterality Date    INSERTION OF PERMANENT PACEMAKER         Review of patient's allergies indicates:   Allergen Reactions    Antisera     Diphtheria toxin preparations     Horse/equine containing products     Klonopin [clonazepam]     Pertussis vaccines     Soma [carisoprodol]     Sulfa (sulfonamide antibiotics)     Tetanus vaccines and toxoid        No current facility-administered medications on file prior to encounter.      No current outpatient medications on file prior to encounter.     Family History     None        Tobacco Use    Smoking status: Not on file   Substance and Sexual Activity    Alcohol use: Not on file    Drug use: Not on file    Sexual activity: Not on file     Review of Systems   Constitutional: Positive for fatigue. Negative for diaphoresis and fever.   HENT: Negative.    Respiratory: Positive for cough, shortness of breath and wheezing.    Cardiovascular: Positive for leg swelling. Negative for chest pain.   Gastrointestinal: Positive for nausea and vomiting. Negative for abdominal pain, constipation and diarrhea.   Genitourinary: Negative.    Musculoskeletal: Negative.    Skin: Positive for color change and pallor.   Neurological: Positive for dizziness and weakness.   Psychiatric/Behavioral: Negative.      Objective:     Vital Signs (Most Recent):  Temp: 100.1 °F (37.8 °C) (03/26/20 1726)  Pulse: 87 (03/26/20 1817)  Resp: (!) 31 (03/26/20 1817)  BP: 100/69 (03/26/20 1817)  SpO2: 100 % (03/26/20 1823) Vital Signs (24h Range):  Temp:  [98.3 °F (36.8 °C)-100.1 °F (37.8 °C)] 100.1 °F (37.8 °C)  Pulse:  [] 87  Resp:  [] 31  SpO2:  [71  %-100 %] 100 %  BP: (100-154)/() 100/69        There is no height or weight on file to calculate BMI.    Physical Exam   Constitutional:   Thin, elderly female, in mild respiratory distress, not diaphoretic   HENT:   Head: Normocephalic and atraumatic.   Eyes: Conjunctivae are normal.   Neck: No thyromegaly present.   Cardiovascular: Regular rhythm and intact distal pulses.   No murmur heard.  Mild tachycardia   Pulmonary/Chest: She has no wheezes.   Increased work of breathing, tachypnea, physical exam difficult due to patient condition, bibasilar crackles   Abdominal: Soft. Bowel sounds are normal. She exhibits no distension. There is no tenderness.   Musculoskeletal: She exhibits edema (b/l 2+ LE edema). She exhibits no deformity.   Lymphadenopathy:     She has no cervical adenopathy.   Neurological:   Oriented to person   Skin: Capillary refill takes more than 3 seconds. There is pallor.   Cool with cyanosis of the digits   Vitals reviewed.          Significant Labs:   Recent Lab Results       03/26/20  1549   03/26/20  1548        Influenza A, Molecular   Negative     Influenza B, Molecular   Negative     Albumin 3.9       Alkaline Phosphatase 85       ALT 40       Anion Gap 14       AST 57       Baso # 0.03       Basophil% 0.4       BILIRUBIN TOTAL 1.0  Comment:  For infants and newborns, interpretation of results should be based  on gestational age, weight and in agreement with clinical  observations.  Premature Infant recommended reference ranges:  Up to 24 hours.............<8.0 mg/dL  Up to 48 hours............<12.0 mg/dL  3-5 days..................<15.0 mg/dL  6-29 days.................<15.0 mg/dL         BNP 2,945  Comment:  Values of less than 100 pg/ml are consistent with non-CHF populations.       BUN, Bld 32       Calcium 8.8       Chloride 96       CO2 20       Creatinine 1.6       Differential Method Automated       eGFR if African American 31.6       eGFR if non African American  27.4  Comment:  Calculation used to obtain the estimated glomerular filtration  rate (eGFR) is the CKD-EPI equation.          Eos # 0.0       Eosinophil% 0.1       Flu A & B Source   Nasal Swab     Glucose 138       Gran # (ANC) 5.9       Gran% 78.7       Hematocrit 38.2       Hemoglobin 12.2       Immature Grans (Abs) 0.03  Comment:  Mild elevation in immature granulocytes is non specific and   can be seen in a variety of conditions including stress response,   acute inflammation, trauma and pregnancy. Correlation with other   laboratory and clinical findings is essential.         Immature Granulocytes 0.4       Lymph # 1.1       Lymph% 14.2       MCH 28.6       MCHC 31.9       MCV 90       Mono # 0.5       Mono% 6.2       MPV 12.1       nRBC 0       Platelets 152       Potassium 5.7       PROTEIN TOTAL 6.4       RBC 4.26       RDW 17.2       Sodium 130       WBC 7.47           All pertinent labs within the past 24 hours have been reviewed.    Significant Imaging: I have reviewed and interpreted all pertinent imaging results/findings within the past 24 hours.

## 2020-03-27 NOTE — PLAN OF CARE
"  Problem: Adult Inpatient Plan of Care  Goal: Plan of Care Review  Outcome: Ongoing, Progressing     Problem: Adult Inpatient Plan of Care  Goal: Optimal Comfort and Wellbeing  Outcome: Ongoing, Progressing     Problem: Adult Inpatient Plan of Care  Goal: Rounds/Family Conference  Outcome: Ongoing, Progressing     Spoke with daughter and updated her on patient's plan of care and condition.  Patient drowsy throughout shift but easily aroused and able to respond to questions.  Pt. encouraged to increase oral intake, patient states "I usually don't have a good appetite".  Patient placed on 50% O2 via venti mask during shift with oxygen saturation levels in mid to upper 90's.  No shortness of breath noted or verbalized by patient during shift.   "

## 2020-03-27 NOTE — HOSPITAL COURSE
Remained on nonrebreather overnight. Improving clinically. BNP pending. Respiratory rate improving. VSS. IVFs started at low rate for gentle hydration. Continue lasix daily.     03/28/2020:  Patient is sitting up eating and tolerating her diet.  Patient is on nasal cannula now at 4 L nasal cannula.  BNP was 1810 phosphorus 4.0, sodium 134 potassium 3.7 chloride 95 bicarb 28 glucose 87 BUN 33 creatinine 1.6 calcium 8.3 and GFR 31.6.  White blood cell count normal with CBC normal and differential normal.  COVID 19 virus not detected.  Patient is clinically improving and will continue treating congestive heart failure.    03/29/2020:  Patient is sitting up and comfortable without shortness of breath.  Vital signs this morning show a blood pressure 185/81 pulse 73 respirations 18 temperature is afebrile 97.2 and 95% saturations.  Magnesium and phosphorus are normal sodium 136 potassium 3.2 chloride 94 bicarb 30 AST 64 ALT 58 and GFR 49.7.  CBC white blood cell count 7.0 hemoglobin 12.5 hematocrit 39.4 platelets 149 and differential shows 75 granulocytes 15 lymphocytes     03/30/2020:  Patient is is continue be comfortable and no shortness of breath overnight.  136/65 pulse 94 respirations 18 and patient has been afebrile at 965 O2 sats were %.  BNP was 2200 this morning however patient is clinically much improved.  Phosphorus is 2.6 sodium 135 BUN creatinine were 20 and 0.9 AST 53 ALT 55 and GFR greater than 60.  White blood cell count 6.5 hemoglobin 13.1 hematocrit 42 and differential was normal.  Patient will be sent back to [facility] for continued recuperation.  The patient will be started on a regular dose of Lasix at 40 mg p.o. daily with supplement of potassium 20 mEq p.o. daily.  Patient will continue all other home medications as previously prescribed.  Patient will be weighed daily and follow by symptoms of skin just of heart failure.  Patient is pleasant sitting up eating and  tolerating her diet and I spoke with her family which is her daughter who was made aware of plans and she was in agreement

## 2020-03-27 NOTE — NURSING
Patient placed on 50% venti mask. Oxygen saturation levels are currently at 94%.  Patient denying any shortness of breath.  Will continue to monitor.

## 2020-03-27 NOTE — H&P
[facility] Hospital Medicine  History & Physical    Patient Name: Bebe Gomez  MRN: [id number]  Admission Date: 3/26/2020  Attending Physician: Jahaira Olea MD  Primary Care Provider: José Miguel Acuna Ii, MD         Patient information was obtained from patient, ER physician and ER records.     Subjective:     Principal Problem:Acute on chronic congestive heart failure    Chief Complaint:   Chief Complaint   Patient presents with    Joint Swelling     bilateral ankle    Shortness of Breath    Altered Mental Status        HPI: Patient is a 89-year-old female with unknown past medical history presents to the ER from nursing home with worsening shortness of breath, hypoxia.  On arrival in the ER O2 sat room the 70s, mottled skin appearance with lower extremity edema.  White blood cell count within normal limits.  Chest x-ray with possible pleural effusion versus bilateral patchy infiltrates.  Patient is oriented to name.  In the ER she was given IV Lasix 60, tested for COVID 19, started on continuous O2 by face mask, given Zithromax 500 mg IV. Hospital called for admission for acute respiratory failure with hypoxia.     Past Medical History:   Diagnosis Date    Allergic rhinitis     Anxiety     Athscl heart disease of native coronary artery w/o ang pctrs     CHF (congestive heart failure)     Constipation     Depression     Dry eye     Hemorrhoids     Hyperlipemia     Hypothyroid     Muscle weakness        Past Surgical History:   Procedure Laterality Date    INSERTION OF PERMANENT PACEMAKER         Review of patient's allergies indicates:   Allergen Reactions    Antisera     Diphtheria toxin preparations     Horse/equine containing products     Klonopin [clonazepam]     Pertussis vaccines     Soma [carisoprodol]     Sulfa (sulfonamide antibiotics)     Tetanus vaccines and toxoid        No current facility-administered medications on file prior to  encounter.      No current outpatient medications on file prior to encounter.     Family History     None        Tobacco Use    Smoking status: Not on file   Substance and Sexual Activity    Alcohol use: Not on file    Drug use: Not on file    Sexual activity: Not on file     Review of Systems   Constitutional: Positive for fatigue. Negative for diaphoresis and fever.   HENT: Negative.    Respiratory: Positive for cough, shortness of breath and wheezing.    Cardiovascular: Positive for leg swelling. Negative for chest pain.   Gastrointestinal: Positive for nausea and vomiting. Negative for abdominal pain, constipation and diarrhea.   Genitourinary: Negative.    Musculoskeletal: Negative.    Skin: Positive for color change and pallor.   Neurological: Positive for dizziness and weakness.   Psychiatric/Behavioral: Negative.      Objective:     Vital Signs (Most Recent):  Temp: 100.1 °F (37.8 °C) (03/26/20 1726)  Pulse: 87 (03/26/20 1817)  Resp: (!) 31 (03/26/20 1817)  BP: 100/69 (03/26/20 1817)  SpO2: 100 % (03/26/20 1823) Vital Signs (24h Range):  Temp:  [98.3 °F (36.8 °C)-100.1 °F (37.8 °C)] 100.1 °F (37.8 °C)  Pulse:  [] 87  Resp:  [] 31  SpO2:  [71 %-100 %] 100 %  BP: (100-154)/() 100/69        There is no height or weight on file to calculate BMI.    Physical Exam   Constitutional:   Thin, elderly female, in mild respiratory distress, not diaphoretic   HENT:   Head: Normocephalic and atraumatic.   Eyes: Conjunctivae are normal.   Neck: No thyromegaly present.   Cardiovascular: Regular rhythm and intact distal pulses.   No murmur heard.  Mild tachycardia   Pulmonary/Chest: She has no wheezes.   Increased work of breathing, tachypnea, physical exam difficult due to patient condition, bibasilar crackles   Abdominal: Soft. Bowel sounds are normal. She exhibits no distension. There is no tenderness.   Musculoskeletal: She exhibits edema (b/l 2+ LE edema). She exhibits no deformity.    Lymphadenopathy:     She has no cervical adenopathy.   Neurological:   Oriented to person   Skin: Capillary refill takes more than 3 seconds. There is pallor.   Cool with cyanosis of the digits   Vitals reviewed.          Significant Labs:   Recent Lab Results       03/26/20  1549   03/26/20  1548        Influenza A, Molecular   Negative     Influenza B, Molecular   Negative     Albumin 3.9       Alkaline Phosphatase 85       ALT 40       Anion Gap 14       AST 57       Baso # 0.03       Basophil% 0.4       BILIRUBIN TOTAL 1.0  Comment:  For infants and newborns, interpretation of results should be based  on gestational age, weight and in agreement with clinical  observations.  Premature Infant recommended reference ranges:  Up to 24 hours.............<8.0 mg/dL  Up to 48 hours............<12.0 mg/dL  3-5 days..................<15.0 mg/dL  6-29 days.................<15.0 mg/dL         BNP 2,945  Comment:  Values of less than 100 pg/ml are consistent with non-CHF populations.       BUN, Bld 32       Calcium 8.8       Chloride 96       CO2 20       Creatinine 1.6       Differential Method Automated       eGFR if African American 31.6       eGFR if non  27.4  Comment:  Calculation used to obtain the estimated glomerular filtration  rate (eGFR) is the CKD-EPI equation.          Eos # 0.0       Eosinophil% 0.1       Flu A & B Source   Nasal Swab     Glucose 138       Gran # (ANC) 5.9       Gran% 78.7       Hematocrit 38.2       Hemoglobin 12.2       Immature Grans (Abs) 0.03  Comment:  Mild elevation in immature granulocytes is non specific and   can be seen in a variety of conditions including stress response,   acute inflammation, trauma and pregnancy. Correlation with other   laboratory and clinical findings is essential.         Immature Granulocytes 0.4       Lymph # 1.1       Lymph% 14.2       MCH 28.6       MCHC 31.9       MCV 90       Mono # 0.5       Mono% 6.2       MPV 12.1       nRBC 0        Platelets 152       Potassium 5.7       PROTEIN TOTAL 6.4       RBC 4.26       RDW 17.2       Sodium 130       WBC 7.47           All pertinent labs within the past 24 hours have been reviewed.    Significant Imaging: I have reviewed and interpreted all pertinent imaging results/findings within the past 24 hours.    Assessment/Plan:     * Acute on chronic congestive heart failure  IV lasix 40 mg daily, consider increasing based on clinical appearance and CXR  BNP near 3000  Strict I/O  Daily weights   Continuous O2 monitoring      SOB (shortness of breath)  Possibly 2/2 acute on chronic heart failure  Diuresis in progress, monitor output      Pneumonia of right lower lobe due to infectious organism  R/o COVID  Zithromax 500 mg IV + Rocephin 1g q24h  WBC wnl  Clinically c/w decompensated heart failure, will diuresis and monitor O2 sats  Consider changing abx in AM if worsening vitals or she develops fever overnight  Holding IVFs at this time but will monitor BP and start if hypotension develops  Hold steroids at this time in setting of possible COVID          VTE Risk Mitigation (From admission, onward)         Ordered     heparin (porcine) injection 5,000 Units  Every 8 hours      03/26/20 1852     IP VTE HIGH RISK PATIENT  Once      03/26/20 1852     Place JUANA hose  Until discontinued      03/26/20 1852               CODE STATUS: DNR    Jahaira Olea MD  Department of Hospital Medicine   [facility]

## 2020-03-28 LAB
ALBUMIN SERPL BCP-MCNC: 3.3 G/DL (ref 3.5–5.2)
ALP SERPL-CCNC: 66 U/L (ref 55–135)
ALT SERPL W/O P-5'-P-CCNC: 61 U/L (ref 10–44)
ANION GAP SERPL CALC-SCNC: 11 MMOL/L (ref 8–16)
AST SERPL-CCNC: 71 U/L (ref 10–40)
BASOPHILS # BLD AUTO: 0.03 K/UL (ref 0–0.2)
BASOPHILS NFR BLD: 0.5 % (ref 0–1.9)
BILIRUB SERPL-MCNC: 0.9 MG/DL (ref 0.1–1)
BNP SERPL-MCNC: 1810 PG/ML (ref 0–99)
BUN SERPL-MCNC: 33 MG/DL (ref 10–30)
CALCIUM SERPL-MCNC: 8.3 MG/DL (ref 8.7–10.5)
CHLORIDE SERPL-SCNC: 95 MMOL/L (ref 95–110)
CO2 SERPL-SCNC: 28 MMOL/L (ref 23–29)
CREAT SERPL-MCNC: 1.6 MG/DL (ref 0.5–1.4)
DIFFERENTIAL METHOD: ABNORMAL
EOSINOPHIL # BLD AUTO: 0 K/UL (ref 0–0.5)
EOSINOPHIL NFR BLD: 0.2 % (ref 0–8)
ERYTHROCYTE [DISTWIDTH] IN BLOOD BY AUTOMATED COUNT: 17.2 % (ref 11.5–14.5)
EST. GFR  (AFRICAN AMERICAN): 31.6 ML/MIN/1.73 M^2
EST. GFR  (NON AFRICAN AMERICAN): 27.4 ML/MIN/1.73 M^2
GLUCOSE SERPL-MCNC: 87 MG/DL (ref 70–110)
HCT VFR BLD AUTO: 38.6 % (ref 37–48.5)
HGB BLD-MCNC: 12.2 G/DL (ref 12–16)
IMM GRANULOCYTES # BLD AUTO: 0.01 K/UL (ref 0–0.04)
IMM GRANULOCYTES NFR BLD AUTO: 0.2 % (ref 0–0.5)
LYMPHOCYTES # BLD AUTO: 1.3 K/UL (ref 1–4.8)
LYMPHOCYTES NFR BLD: 19.7 % (ref 18–48)
MAGNESIUM SERPL-MCNC: 2 MG/DL (ref 1.6–2.6)
MCH RBC QN AUTO: 28.2 PG (ref 27–31)
MCHC RBC AUTO-ENTMCNC: 31.6 G/DL (ref 32–36)
MCV RBC AUTO: 89 FL (ref 82–98)
MONOCYTES # BLD AUTO: 0.6 K/UL (ref 0.3–1)
MONOCYTES NFR BLD: 9 % (ref 4–15)
NEUTROPHILS # BLD AUTO: 4.7 K/UL (ref 1.8–7.7)
NEUTROPHILS NFR BLD: 70.4 % (ref 38–73)
NRBC BLD-RTO: 0 /100 WBC
PHOSPHATE SERPL-MCNC: 4 MG/DL (ref 2.7–4.5)
PLATELET # BLD AUTO: 127 K/UL (ref 150–350)
PMV BLD AUTO: 11.7 FL (ref 9.2–12.9)
POTASSIUM SERPL-SCNC: 3.7 MMOL/L (ref 3.5–5.1)
PROT SERPL-MCNC: 5.6 G/DL (ref 6–8.4)
RBC # BLD AUTO: 4.32 M/UL (ref 4–5.4)
SARS-COV-2 RNA RESP QL NAA+PROBE: NOT DETECTED
SODIUM SERPL-SCNC: 134 MMOL/L (ref 136–145)
WBC # BLD AUTO: 6.65 K/UL (ref 3.9–12.7)

## 2020-03-28 PROCEDURE — 99232 PR SUBSEQUENT HOSPITAL CARE,LEVL II: ICD-10-PCS | Mod: ,,, | Performed by: INTERNAL MEDICINE

## 2020-03-28 PROCEDURE — 99232 SBSQ HOSP IP/OBS MODERATE 35: CPT | Mod: ,,, | Performed by: INTERNAL MEDICINE

## 2020-03-28 PROCEDURE — 80053 COMPREHEN METABOLIC PANEL: CPT

## 2020-03-28 PROCEDURE — 21400001 HC TELEMETRY ROOM

## 2020-03-28 PROCEDURE — 63600175 PHARM REV CODE 636 W HCPCS: Performed by: FAMILY MEDICINE

## 2020-03-28 PROCEDURE — 85025 COMPLETE CBC W/AUTO DIFF WBC: CPT

## 2020-03-28 PROCEDURE — 27000221 HC OXYGEN, UP TO 24 HOURS

## 2020-03-28 PROCEDURE — 94761 N-INVAS EAR/PLS OXIMETRY MLT: CPT

## 2020-03-28 PROCEDURE — 84100 ASSAY OF PHOSPHORUS: CPT

## 2020-03-28 PROCEDURE — 36415 COLL VENOUS BLD VENIPUNCTURE: CPT

## 2020-03-28 PROCEDURE — 83880 ASSAY OF NATRIURETIC PEPTIDE: CPT

## 2020-03-28 PROCEDURE — 83735 ASSAY OF MAGNESIUM: CPT

## 2020-03-28 RX ADMIN — HEPARIN SODIUM 5000 UNITS: 5000 INJECTION, SOLUTION INTRAVENOUS; SUBCUTANEOUS at 05:03

## 2020-03-28 RX ADMIN — SODIUM CHLORIDE 1000 ML: 0.9 INJECTION, SOLUTION INTRAVENOUS at 01:03

## 2020-03-28 RX ADMIN — CEFTRIAXONE SODIUM 1 G: 1 INJECTION, POWDER, FOR SOLUTION INTRAMUSCULAR; INTRAVENOUS at 09:03

## 2020-03-28 RX ADMIN — AZITHROMYCIN DIHYDRATE 500 MG: 500 INJECTION, POWDER, LYOPHILIZED, FOR SOLUTION INTRAVENOUS at 10:03

## 2020-03-28 RX ADMIN — FUROSEMIDE 40 MG: 10 INJECTION, SOLUTION INTRAMUSCULAR; INTRAVENOUS at 08:03

## 2020-03-28 RX ADMIN — HEPARIN SODIUM 5000 UNITS: 5000 INJECTION, SOLUTION INTRAVENOUS; SUBCUTANEOUS at 01:03

## 2020-03-28 RX ADMIN — HEPARIN SODIUM 5000 UNITS: 5000 INJECTION, SOLUTION INTRAVENOUS; SUBCUTANEOUS at 09:03

## 2020-03-28 NOTE — ASSESSMENT & PLAN NOTE
IV lasix 40 mg daily, consider increasing based on clinical appearance and CXR  BNP near 3000  Strict I/O  Daily weights   Continuous O2 monitoring    3/26/20  Continue on NRB O2, wean as tolerated  IV lasix 40 mg daily, may need to increase frequency  Trend BNP  Strict I/O  Started gentle intravascular hydration, monitor closely for fluid overload    3/27/20  Echo ordered but will hold until COVID testing results unless imaging becomes emergent  Continue IV fluids at low rate  Daily lasix  Strict I/O    03/28/2020:  Echocardiogram is pending  Continue diuresis  Repeat BNP in the a.m  Monitor renal function closely.

## 2020-03-28 NOTE — SUBJECTIVE & OBJECTIVE
Interval History:     Review of Systems   Constitutional: Positive for fatigue. Negative for activity change, appetite change and fever.   HENT: Negative for congestion, ear discharge, mouth sores, nosebleeds, rhinorrhea, sinus pressure, sinus pain and tinnitus.    Eyes: Negative.  Negative for pain, redness and itching.   Respiratory: Positive for chest tightness, shortness of breath and wheezing. Negative for apnea, cough, choking and stridor.    Cardiovascular: Negative for chest pain, palpitations and leg swelling.   Gastrointestinal: Positive for nausea. Negative for abdominal distention, abdominal pain, anal bleeding, blood in stool, constipation and diarrhea.   Endocrine: Negative.    Genitourinary: Negative for difficulty urinating, flank pain, frequency and urgency.   Musculoskeletal: Positive for arthralgias and gait problem. Negative for back pain and myalgias.   Skin: Negative for color change and pallor.   Allergic/Immunologic: Negative.    Neurological: Positive for weakness. Negative for dizziness, facial asymmetry, light-headedness and headaches.   Hematological: Negative for adenopathy. Does not bruise/bleed easily.   Psychiatric/Behavioral: The patient is nervous/anxious.      Objective:     Vital Signs (Most Recent):  Temp: 96.4 °F (35.8 °C) (03/28/20 1444)  Pulse: 73 (03/28/20 1444)  Resp: 16 (03/28/20 1444)  BP: 130/60 (03/28/20 1444)  SpO2: 98 % (03/28/20 1444) Vital Signs (24h Range):  Temp:  [96.1 °F (35.6 °C)-97.8 °F (36.6 °C)] 96.4 °F (35.8 °C)  Pulse:  [] 73  Resp:  [16-20] 16  SpO2:  [97 %-98 %] 98 %  BP: (117-150)/(54-78) 130/60     Weight: 52.4 kg (115 lb 9.6 oz)  Body mass index is 19.84 kg/m².    Intake/Output Summary (Last 24 hours) at 3/28/2020 1610  Last data filed at 3/28/2020 1414  Gross per 24 hour   Intake 998.75 ml   Output 725 ml   Net 273.75 ml      Physical Exam   Constitutional: She is oriented to person, place, and time. She appears well-developed and  well-nourished.   HENT:   Head: Normocephalic and atraumatic.   Eyes: Pupils are equal, round, and reactive to light. EOM are normal.   Neck: Normal range of motion. Neck supple. No tracheal deviation present. No thyromegaly present.   Cardiovascular: Normal rate, regular rhythm, normal heart sounds and intact distal pulses.   Pulmonary/Chest: Effort normal. She has wheezes. She exhibits tenderness.   Abdominal: Soft. Bowel sounds are normal. She exhibits no distension. There is no tenderness. There is no rebound and no guarding.   Musculoskeletal: Normal range of motion.   Lymphadenopathy:     She has no cervical adenopathy.   Neurological: She is alert and oriented to person, place, and time.   Skin: Skin is warm and dry. Capillary refill takes less than 2 seconds.   Psychiatric: She has a normal mood and affect. Her behavior is normal. Judgment and thought content normal.   Nursing note and vitals reviewed.      Significant Labs:   Recent Lab Results       03/28/20  0556        Albumin 3.3     Alkaline Phosphatase 66     ALT 61     Anion Gap 11     AST 71     Baso # 0.03     Basophil% 0.5     BILIRUBIN TOTAL 0.9  Comment:  For infants and newborns, interpretation of results should be based  on gestational age, weight and in agreement with clinical  observations.  Premature Infant recommended reference ranges:  Up to 24 hours.............<8.0 mg/dL  Up to 48 hours............<12.0 mg/dL  3-5 days..................<15.0 mg/dL  6-29 days.................<15.0 mg/dL       BNP 1,810  Comment:  Values of less than 100 pg/ml are consistent with non-CHF populations.     BUN, Bld 33     Calcium 8.3     Chloride 95     CO2 28     Creatinine 1.6     Differential Method Automated     eGFR if African American 31.6     eGFR if non  27.4  Comment:  Calculation used to obtain the estimated glomerular filtration  rate (eGFR) is the CKD-EPI equation.        Eos # 0.0     Eosinophil% 0.2     Glucose 87     Gran #  (ANC) 4.7     Gran% 70.4     Hematocrit 38.6     Hemoglobin 12.2     Immature Grans (Abs) 0.01  Comment:  Mild elevation in immature granulocytes is non specific and   can be seen in a variety of conditions including stress response,   acute inflammation, trauma and pregnancy. Correlation with other   laboratory and clinical findings is essential.       Immature Granulocytes 0.2     Lymph # 1.3     Lymph% 19.7     Magnesium 2.0     MCH 28.2     MCHC 31.6     MCV 89     Mono # 0.6     Mono% 9.0     MPV 11.7     nRBC 0     Phosphorus 4.0     Platelets 127     Potassium 3.7     PROTEIN TOTAL 5.6     RBC 4.32     RDW 17.2     Sodium 134     WBC 6.65         All pertinent labs within the past 24 hours have been reviewed.    Significant Imaging: I have reviewed and interpreted all pertinent imaging results/findings within the past 24 hours.

## 2020-03-28 NOTE — PLAN OF CARE
Problem: Adult Inpatient Plan of Care  Goal: Plan of Care Review  Outcome: Ongoing, Progressing     Problem: Skin Injury Risk Increased  Goal: Skin Health and Integrity  Outcome: Ongoing, Progressing     Patient weaned down to 3L NC.  Oxygen saturation levels in the upper 90's.  No distress noted or verbalized by patient during shift.

## 2020-03-28 NOTE — PROGRESS NOTES
[facility] Medicine  Progress Note    Patient Name: Bebe Gomez  MRN: [id number]  Patient Class: IP- Inpatient   Admission Date: 3/26/2020  Length of Stay: 2 days  Attending Physician: Jahaira Olea MD  Primary Care Provider: José Miguel Acuna Ii, MD        Subjective:     Principal Problem:Acute on chronic congestive heart failure        HPI:  Patient is a 89-year-old female with unknown past medical history presents to the ER from nursing home with worsening shortness of breath, hypoxia.  On arrival in the ER O2 sat room the 70s, mottled skin appearance with lower extremity edema.  White blood cell count within normal limits.  Chest x-ray with possible pleural effusion versus bilateral patchy infiltrates.  Patient is oriented to name.  In the ER she was given IV Lasix 60, tested for COVID 19, started on continuous O2 by face mask, given Zithromax 500 mg IV. Hospital called for admission for acute respiratory failure with hypoxia.     Overview/Hospital Course:  Remained on nonrebreather overnight. Improving clinically. BNP pending. Respiratory rate improving. VSS. IVFs started at low rate for gentle hydration. Continue lasix daily.     03/28/2020:  Patient is sitting up eating and tolerating her diet.  Patient is on nasal cannula now at 4 L nasal cannula.  BNP was 1810 phosphorus 4.0, sodium 134 potassium 3.7 chloride 95 bicarb 28 glucose 87 BUN 33 creatinine 1.6 calcium 8.3 and GFR 31.6.  White blood cell count normal with CBC normal and differential normal.  COVID 19 virus not detected.  Patient is clinically improving and will continue treating congestive heart failure.    Interval History:     Review of Systems   Constitutional: Positive for fatigue. Negative for activity change, appetite change and fever.   HENT: Negative for congestion, ear discharge, mouth sores, nosebleeds, rhinorrhea, sinus pressure, sinus pain and tinnitus.    Eyes: Negative.  Negative  for pain, redness and itching.   Respiratory: Positive for chest tightness, shortness of breath and wheezing. Negative for apnea, cough, choking and stridor.    Cardiovascular: Negative for chest pain, palpitations and leg swelling.   Gastrointestinal: Positive for nausea. Negative for abdominal distention, abdominal pain, anal bleeding, blood in stool, constipation and diarrhea.   Endocrine: Negative.    Genitourinary: Negative for difficulty urinating, flank pain, frequency and urgency.   Musculoskeletal: Positive for arthralgias and gait problem. Negative for back pain and myalgias.   Skin: Negative for color change and pallor.   Allergic/Immunologic: Negative.    Neurological: Positive for weakness. Negative for dizziness, facial asymmetry, light-headedness and headaches.   Hematological: Negative for adenopathy. Does not bruise/bleed easily.   Psychiatric/Behavioral: The patient is nervous/anxious.      Objective:     Vital Signs (Most Recent):  Temp: 96.4 °F (35.8 °C) (03/28/20 1444)  Pulse: 73 (03/28/20 1444)  Resp: 16 (03/28/20 1444)  BP: 130/60 (03/28/20 1444)  SpO2: 98 % (03/28/20 1444) Vital Signs (24h Range):  Temp:  [96.1 °F (35.6 °C)-97.8 °F (36.6 °C)] 96.4 °F (35.8 °C)  Pulse:  [] 73  Resp:  [16-20] 16  SpO2:  [97 %-98 %] 98 %  BP: (117-150)/(54-78) 130/60     Weight: 52.4 kg (115 lb 9.6 oz)  Body mass index is 19.84 kg/m².    Intake/Output Summary (Last 24 hours) at 3/28/2020 1610  Last data filed at 3/28/2020 1414  Gross per 24 hour   Intake 998.75 ml   Output 725 ml   Net 273.75 ml      Physical Exam   Constitutional: She is oriented to person, place, and time. She appears well-developed and well-nourished.   HENT:   Head: Normocephalic and atraumatic.   Eyes: Pupils are equal, round, and reactive to light. EOM are normal.   Neck: Normal range of motion. Neck supple. No tracheal deviation present. No thyromegaly present.   Cardiovascular: Normal rate, regular rhythm, normal heart sounds and  intact distal pulses.   Pulmonary/Chest: Effort normal. She has wheezes. She exhibits tenderness.   Abdominal: Soft. Bowel sounds are normal. She exhibits no distension. There is no tenderness. There is no rebound and no guarding.   Musculoskeletal: Normal range of motion.   Lymphadenopathy:     She has no cervical adenopathy.   Neurological: She is alert and oriented to person, place, and time.   Skin: Skin is warm and dry. Capillary refill takes less than 2 seconds.   Psychiatric: She has a normal mood and affect. Her behavior is normal. Judgment and thought content normal.   Nursing note and vitals reviewed.      Significant Labs:   Recent Lab Results       03/28/20  0556        Albumin 3.3     Alkaline Phosphatase 66     ALT 61     Anion Gap 11     AST 71     Baso # 0.03     Basophil% 0.5     BILIRUBIN TOTAL 0.9  Comment:  For infants and newborns, interpretation of results should be based  on gestational age, weight and in agreement with clinical  observations.  Premature Infant recommended reference ranges:  Up to 24 hours.............<8.0 mg/dL  Up to 48 hours............<12.0 mg/dL  3-5 days..................<15.0 mg/dL  6-29 days.................<15.0 mg/dL       BNP 1,810  Comment:  Values of less than 100 pg/ml are consistent with non-CHF populations.     BUN, Bld 33     Calcium 8.3     Chloride 95     CO2 28     Creatinine 1.6     Differential Method Automated     eGFR if African American 31.6     eGFR if non  27.4  Comment:  Calculation used to obtain the estimated glomerular filtration  rate (eGFR) is the CKD-EPI equation.        Eos # 0.0     Eosinophil% 0.2     Glucose 87     Gran # (ANC) 4.7     Gran% 70.4     Hematocrit 38.6     Hemoglobin 12.2     Immature Grans (Abs) 0.01  Comment:  Mild elevation in immature granulocytes is non specific and   can be seen in a variety of conditions including stress response,   acute inflammation, trauma and pregnancy. Correlation with other    laboratory and clinical findings is essential.       Immature Granulocytes 0.2     Lymph # 1.3     Lymph% 19.7     Magnesium 2.0     MCH 28.2     MCHC 31.6     MCV 89     Mono # 0.6     Mono% 9.0     MPV 11.7     nRBC 0     Phosphorus 4.0     Platelets 127     Potassium 3.7     PROTEIN TOTAL 5.6     RBC 4.32     RDW 17.2     Sodium 134     WBC 6.65         All pertinent labs within the past 24 hours have been reviewed.    Significant Imaging: I have reviewed and interpreted all pertinent imaging results/findings within the past 24 hours.      Assessment/Plan:      * Acute on chronic congestive heart failure  IV lasix 40 mg daily, consider increasing based on clinical appearance and CXR  BNP near 3000  Strict I/O  Daily weights   Continuous O2 monitoring    3/26/20  Continue on NRB O2, wean as tolerated  IV lasix 40 mg daily, may need to increase frequency  Trend BNP  Strict I/O  Started gentle intravascular hydration, monitor closely for fluid overload    3/27/20  Echo ordered but will hold until COVID testing results unless imaging becomes emergent  Continue IV fluids at low rate  Daily lasix  Strict I/O    03/28/2020:  Echocardiogram is pending  Continue diuresis  Repeat BNP in the a.m  Monitor renal function closely.        SOB (shortness of breath)  Possibly 2/2 acute on chronic heart failure  Diuresis in progress, monitor output      Pneumonia of right lower lobe due to infectious organism  R/o COVID  Zithromax 500 mg IV + Rocephin 1g q24h  WBC wnl  Clinically c/w decompensated heart failure, will diuresis and monitor O2 sats  Consider changing abx in AM if worsening vitals or she develops fever overnight  Holding IVFs at this time but will monitor BP and start if hypotension develops  Hold steroids at this time in setting of possible COVID    3/26/20  COVID pending  Continue current antibiotics  WBC remains wnl    3/27/20  Difficulty obtaining oxygen saturation readings, will monitor clinically.   Continue  antibiotics          VTE Risk Mitigation (From admission, onward)         Ordered     heparin (porcine) injection 5,000 Units  Every 8 hours      03/26/20 1852     IP VTE HIGH RISK PATIENT  Once      03/26/20 1852     Place JUANA hose  Until discontinued      03/26/20 1852                      Manolo Meyer MD  Department of Hospital Medicine   [facility]

## 2020-03-29 LAB
ALBUMIN SERPL BCP-MCNC: 3.2 G/DL (ref 3.5–5.2)
ALP SERPL-CCNC: 68 U/L (ref 55–135)
ALT SERPL W/O P-5'-P-CCNC: 58 U/L (ref 10–44)
ANION GAP SERPL CALC-SCNC: 12 MMOL/L (ref 8–16)
AST SERPL-CCNC: 64 U/L (ref 10–40)
BASOPHILS # BLD AUTO: 0.03 K/UL (ref 0–0.2)
BASOPHILS NFR BLD: 0.4 % (ref 0–1.9)
BILIRUB SERPL-MCNC: 0.8 MG/DL (ref 0.1–1)
BNP SERPL-MCNC: 1381 PG/ML (ref 0–99)
BUN SERPL-MCNC: 24 MG/DL (ref 10–30)
CALCIUM SERPL-MCNC: 8.4 MG/DL (ref 8.7–10.5)
CHLORIDE SERPL-SCNC: 94 MMOL/L (ref 95–110)
CO2 SERPL-SCNC: 30 MMOL/L (ref 23–29)
CREAT SERPL-MCNC: 1.1 MG/DL (ref 0.5–1.4)
DIFFERENTIAL METHOD: ABNORMAL
EOSINOPHIL # BLD AUTO: 0.1 K/UL (ref 0–0.5)
EOSINOPHIL NFR BLD: 0.9 % (ref 0–8)
ERYTHROCYTE [DISTWIDTH] IN BLOOD BY AUTOMATED COUNT: 17.1 % (ref 11.5–14.5)
EST. GFR  (AFRICAN AMERICAN): 49.7 ML/MIN/1.73 M^2
EST. GFR  (NON AFRICAN AMERICAN): 43.1 ML/MIN/1.73 M^2
GLUCOSE SERPL-MCNC: 82 MG/DL (ref 70–110)
HCT VFR BLD AUTO: 39.4 % (ref 37–48.5)
HGB BLD-MCNC: 12.5 G/DL (ref 12–16)
IMM GRANULOCYTES # BLD AUTO: 0.01 K/UL (ref 0–0.04)
IMM GRANULOCYTES NFR BLD AUTO: 0.1 % (ref 0–0.5)
LYMPHOCYTES # BLD AUTO: 1.1 K/UL (ref 1–4.8)
LYMPHOCYTES NFR BLD: 15 % (ref 18–48)
MAGNESIUM SERPL-MCNC: 1.9 MG/DL (ref 1.6–2.6)
MCH RBC QN AUTO: 28.2 PG (ref 27–31)
MCHC RBC AUTO-ENTMCNC: 31.7 G/DL (ref 32–36)
MCV RBC AUTO: 89 FL (ref 82–98)
MONOCYTES # BLD AUTO: 0.6 K/UL (ref 0.3–1)
MONOCYTES NFR BLD: 8.5 % (ref 4–15)
NEUTROPHILS # BLD AUTO: 5.3 K/UL (ref 1.8–7.7)
NEUTROPHILS NFR BLD: 75.1 % (ref 38–73)
NRBC BLD-RTO: 0 /100 WBC
PHOSPHATE SERPL-MCNC: 3.4 MG/DL (ref 2.7–4.5)
PLATELET # BLD AUTO: 149 K/UL (ref 150–350)
PMV BLD AUTO: 11.5 FL (ref 9.2–12.9)
POTASSIUM SERPL-SCNC: 3.2 MMOL/L (ref 3.5–5.1)
PROT SERPL-MCNC: 5.6 G/DL (ref 6–8.4)
RBC # BLD AUTO: 4.43 M/UL (ref 4–5.4)
SODIUM SERPL-SCNC: 136 MMOL/L (ref 136–145)
WBC # BLD AUTO: 7.02 K/UL (ref 3.9–12.7)

## 2020-03-29 PROCEDURE — 99232 PR SUBSEQUENT HOSPITAL CARE,LEVL II: ICD-10-PCS | Mod: ,,, | Performed by: INTERNAL MEDICINE

## 2020-03-29 PROCEDURE — 83735 ASSAY OF MAGNESIUM: CPT

## 2020-03-29 PROCEDURE — 63600175 PHARM REV CODE 636 W HCPCS: Performed by: INTERNAL MEDICINE

## 2020-03-29 PROCEDURE — 99232 SBSQ HOSP IP/OBS MODERATE 35: CPT | Mod: ,,, | Performed by: INTERNAL MEDICINE

## 2020-03-29 PROCEDURE — 27000221 HC OXYGEN, UP TO 24 HOURS

## 2020-03-29 PROCEDURE — 83880 ASSAY OF NATRIURETIC PEPTIDE: CPT

## 2020-03-29 PROCEDURE — 94761 N-INVAS EAR/PLS OXIMETRY MLT: CPT

## 2020-03-29 PROCEDURE — 85025 COMPLETE CBC W/AUTO DIFF WBC: CPT

## 2020-03-29 PROCEDURE — 25000003 PHARM REV CODE 250: Performed by: INTERNAL MEDICINE

## 2020-03-29 PROCEDURE — 36415 COLL VENOUS BLD VENIPUNCTURE: CPT

## 2020-03-29 PROCEDURE — 25000003 PHARM REV CODE 250: Performed by: FAMILY MEDICINE

## 2020-03-29 PROCEDURE — 21400001 HC TELEMETRY ROOM

## 2020-03-29 PROCEDURE — 84100 ASSAY OF PHOSPHORUS: CPT

## 2020-03-29 PROCEDURE — 80053 COMPREHEN METABOLIC PANEL: CPT

## 2020-03-29 PROCEDURE — 63600175 PHARM REV CODE 636 W HCPCS: Performed by: FAMILY MEDICINE

## 2020-03-29 PROCEDURE — A4216 STERILE WATER/SALINE, 10 ML: HCPCS | Performed by: FAMILY MEDICINE

## 2020-03-29 RX ORDER — POTASSIUM CHLORIDE 20 MEQ/1
40 TABLET, EXTENDED RELEASE ORAL ONCE
Status: COMPLETED | OUTPATIENT
Start: 2020-03-29 | End: 2020-03-29

## 2020-03-29 RX ORDER — POTASSIUM CHLORIDE 7.45 MG/ML
10 INJECTION INTRAVENOUS ONCE
Status: COMPLETED | OUTPATIENT
Start: 2020-03-29 | End: 2020-03-29

## 2020-03-29 RX ADMIN — POTASSIUM CHLORIDE 40 MEQ: 1500 TABLET, EXTENDED RELEASE ORAL at 12:03

## 2020-03-29 RX ADMIN — SODIUM CHLORIDE, PRESERVATIVE FREE 10 ML: 5 INJECTION INTRAVENOUS at 11:03

## 2020-03-29 RX ADMIN — HEPARIN SODIUM 5000 UNITS: 5000 INJECTION, SOLUTION INTRAVENOUS; SUBCUTANEOUS at 02:03

## 2020-03-29 RX ADMIN — HEPARIN SODIUM 5000 UNITS: 5000 INJECTION, SOLUTION INTRAVENOUS; SUBCUTANEOUS at 06:03

## 2020-03-29 RX ADMIN — AZITHROMYCIN DIHYDRATE 500 MG: 500 INJECTION, POWDER, LYOPHILIZED, FOR SOLUTION INTRAVENOUS at 08:03

## 2020-03-29 RX ADMIN — FUROSEMIDE 40 MG: 10 INJECTION, SOLUTION INTRAMUSCULAR; INTRAVENOUS at 09:03

## 2020-03-29 RX ADMIN — CEFTRIAXONE SODIUM 1 G: 1 INJECTION, POWDER, FOR SOLUTION INTRAMUSCULAR; INTRAVENOUS at 09:03

## 2020-03-29 RX ADMIN — HEPARIN SODIUM 5000 UNITS: 5000 INJECTION, SOLUTION INTRAVENOUS; SUBCUTANEOUS at 10:03

## 2020-03-29 RX ADMIN — POTASSIUM CHLORIDE 10 MEQ: 10 INJECTION, SOLUTION INTRAVENOUS at 12:03

## 2020-03-29 NOTE — ASSESSMENT & PLAN NOTE
IV lasix 40 mg daily, consider increasing based on clinical appearance and CXR  BNP near 3000  Strict I/O  Daily weights   Continuous O2 monitoring    3/26/20  Continue on NRB O2, wean as tolerated  IV lasix 40 mg daily, may need to increase frequency  Trend BNP  Strict I/O  Started gentle intravascular hydration, monitor closely for fluid overload    3/27/20  Echo ordered but will hold until COVID testing results unless imaging becomes emergent  Continue IV fluids at low rate  Daily lasix  Strict I/O    03/28/2020:  Echocardiogram is pending  Continue diuresis  Repeat BNP in the a.m  Monitor renal function closely.    03/29/2020:  Follow-up echocardiogram when done in the a.m..  BNP improving at 1381  Patient is asymptomatic at this point  Repeat labs in the a.m. to include CBC and CMP  Monitor intake and output  If stable possible discharge to home tomorrow

## 2020-03-29 NOTE — SUBJECTIVE & OBJECTIVE
Interval History:     Review of Systems   Constitutional: Positive for fatigue. Negative for activity change, appetite change and fever.   HENT: Negative for congestion, ear discharge, mouth sores, nosebleeds, rhinorrhea, sinus pressure, sinus pain and tinnitus.    Eyes: Negative.  Negative for pain, redness and itching.   Respiratory: Positive for cough, chest tightness and shortness of breath. Negative for apnea, choking, wheezing and stridor.    Cardiovascular: Negative for chest pain, palpitations and leg swelling.   Gastrointestinal: Negative for abdominal distention, abdominal pain, anal bleeding, blood in stool, constipation and diarrhea.   Endocrine: Negative.    Genitourinary: Negative for difficulty urinating, flank pain, frequency and urgency.   Musculoskeletal: Positive for arthralgias and myalgias. Negative for back pain and gait problem.   Skin: Negative for color change and pallor.   Allergic/Immunologic: Negative.    Neurological: Positive for light-headedness. Negative for dizziness, facial asymmetry, weakness and headaches.   Hematological: Negative for adenopathy. Does not bruise/bleed easily.   Psychiatric/Behavioral: Negative for agitation. The patient is nervous/anxious.      Objective:     Vital Signs (Most Recent):  Temp: 97.2 °F (36.2 °C) (03/29/20 1206)  Pulse: 87 (03/29/20 1600)  Resp: 18 (03/29/20 1215)  BP: (!) 162/72 (03/29/20 1206)  SpO2: 100 % (03/29/20 1215) Vital Signs (24h Range):  Temp:  [96.3 °F (35.7 °C)-97.2 °F (36.2 °C)] 97.2 °F (36.2 °C)  Pulse:  [70-88] 87  Resp:  [16-18] 18  SpO2:  [95 %-100 %] 100 %  BP: (129-185)/(60-81) 162/72     Weight: 52.4 kg (115 lb 9.6 oz)  Body mass index is 19.84 kg/m².    Intake/Output Summary (Last 24 hours) at 3/29/2020 1632  Last data filed at 3/29/2020 1400  Gross per 24 hour   Intake 4780 ml   Output --   Net 4780 ml      Physical Exam   Constitutional: She is oriented to person, place, and time. She appears well-developed and  well-nourished.   HENT:   Head: Normocephalic and atraumatic.   Eyes: Pupils are equal, round, and reactive to light. EOM are normal.   Neck: Normal range of motion. Neck supple. No tracheal deviation present. No thyromegaly present.   Cardiovascular: Normal rate, regular rhythm and normal heart sounds.   Pulmonary/Chest: She is in respiratory distress. She has rales.   Abdominal: Soft. Bowel sounds are normal. She exhibits no distension. There is no tenderness. There is no rebound and no guarding.   Musculoskeletal: Normal range of motion.   Lymphadenopathy:     She has no cervical adenopathy.   Neurological: She is alert and oriented to person, place, and time.   Skin: Skin is warm and dry. Capillary refill takes less than 2 seconds.   Psychiatric: She has a normal mood and affect. Her behavior is normal. Judgment and thought content normal.   Nursing note and vitals reviewed.      Significant Labs:   Recent Lab Results       03/29/20  0734        Albumin 3.2     Alkaline Phosphatase 68     ALT 58     Anion Gap 12     AST 64     Baso # 0.03     Basophil% 0.4     BILIRUBIN TOTAL 0.8  Comment:  For infants and newborns, interpretation of results should be based  on gestational age, weight and in agreement with clinical  observations.  Premature Infant recommended reference ranges:  Up to 24 hours.............<8.0 mg/dL  Up to 48 hours............<12.0 mg/dL  3-5 days..................<15.0 mg/dL  6-29 days.................<15.0 mg/dL       BNP 1,381  Comment:  Values of less than 100 pg/ml are consistent with non-CHF populations.     BUN, Bld 24     Calcium 8.4     Chloride 94     CO2 30     Creatinine 1.1     Differential Method Automated     eGFR if African American 49.7     eGFR if non  43.1  Comment:  Calculation used to obtain the estimated glomerular filtration  rate (eGFR) is the CKD-EPI equation.        Eos # 0.1     Eosinophil% 0.9     Glucose 82     Gran # (ANC) 5.3     Gran% 75.1      Hematocrit 39.4     Hemoglobin 12.5     Immature Grans (Abs) 0.01  Comment:  Mild elevation in immature granulocytes is non specific and   can be seen in a variety of conditions including stress response,   acute inflammation, trauma and pregnancy. Correlation with other   laboratory and clinical findings is essential.       Immature Granulocytes 0.1     Lymph # 1.1     Lymph% 15.0     Magnesium 1.9     MCH 28.2     MCHC 31.7     MCV 89     Mono # 0.6     Mono% 8.5     MPV 11.5     nRBC 0     Phosphorus 3.4     Platelets 149     Potassium 3.2     PROTEIN TOTAL 5.6     RBC 4.43     RDW 17.1     Sodium 136     WBC 7.02         All pertinent labs within the past 24 hours have been reviewed.    Significant Imaging: I have reviewed and interpreted all pertinent imaging results/findings within the past 24 hours.

## 2020-03-29 NOTE — PROGRESS NOTES
[facility]  Progress Note    Patient Name: Bebe Gomez  MRN: [id number]  Patient Class: IP- Inpatient   Admission Date: 3/26/2020  Length of Stay: 3 days  Attending Physician: Jahaira Olea MD  Primary Care Provider: José Miguel Acuna Ii, MD        Subjective:     Principal Problem:Acute on chronic congestive heart failure        HPI:  Patient is a 89-year-old female with unknown past medical history presents to the ER from nursing home with worsening shortness of breath, hypoxia.  On arrival in the ER O2 sat room the 70s, mottled skin appearance with lower extremity edema.  White blood cell count within normal limits.  Chest x-ray with possible pleural effusion versus bilateral patchy infiltrates.  Patient is oriented to name.  In the ER she was given IV Lasix 60, tested for COVID 19, started on continuous O2 by face mask, given Zithromax 500 mg IV. Hospital called for admission for acute respiratory failure with hypoxia.     Overview/Hospital Course:  Remained on nonrebreather overnight. Improving clinically. BNP pending. Respiratory rate improving. VSS. IVFs started at low rate for gentle hydration. Continue lasix daily.     03/28/2020:  Patient is sitting up eating and tolerating her diet.  Patient is on nasal cannula now at 4 L nasal cannula.  BNP was 1810 phosphorus 4.0, sodium 134 potassium 3.7 chloride 95 bicarb 28 glucose 87 BUN 33 creatinine 1.6 calcium 8.3 and GFR 31.6.  White blood cell count normal with CBC normal and differential normal.  COVID 19 virus not detected.  Patient is clinically improving and will continue treating congestive heart failure.    03/29/2020:  Patient is sitting up and comfortable without shortness of breath.  Vital signs this morning show a blood pressure 185/81 pulse 73 respirations 18 temperature is afebrile 97.2 and 95% saturations.  Magnesium and phosphorus are normal sodium 136 potassium 3.2 chloride 94 bicarb 30  AST 64 ALT 58 and GFR 49.7.  CBC white blood cell count 7.0 hemoglobin 12.5 hematocrit 39.4 platelets 149 and differential shows 75 granulocytes 15 lymphocytes     Interval History:     Review of Systems   Constitutional: Positive for fatigue. Negative for activity change, appetite change and fever.   HENT: Negative for congestion, ear discharge, mouth sores, nosebleeds, rhinorrhea, sinus pressure, sinus pain and tinnitus.    Eyes: Negative.  Negative for pain, redness and itching.   Respiratory: Positive for cough, chest tightness and shortness of breath. Negative for apnea, choking, wheezing and stridor.    Cardiovascular: Negative for chest pain, palpitations and leg swelling.   Gastrointestinal: Negative for abdominal distention, abdominal pain, anal bleeding, blood in stool, constipation and diarrhea.   Endocrine: Negative.    Genitourinary: Negative for difficulty urinating, flank pain, frequency and urgency.   Musculoskeletal: Positive for arthralgias and myalgias. Negative for back pain and gait problem.   Skin: Negative for color change and pallor.   Allergic/Immunologic: Negative.    Neurological: Positive for light-headedness. Negative for dizziness, facial asymmetry, weakness and headaches.   Hematological: Negative for adenopathy. Does not bruise/bleed easily.   Psychiatric/Behavioral: Negative for agitation. The patient is nervous/anxious.      Objective:     Vital Signs (Most Recent):  Temp: 97.2 °F (36.2 °C) (03/29/20 1206)  Pulse: 87 (03/29/20 1600)  Resp: 18 (03/29/20 1215)  BP: (!) 162/72 (03/29/20 1206)  SpO2: 100 % (03/29/20 1215) Vital Signs (24h Range):  Temp:  [96.3 °F (35.7 °C)-97.2 °F (36.2 °C)] 97.2 °F (36.2 °C)  Pulse:  [70-88] 87  Resp:  [16-18] 18  SpO2:  [95 %-100 %] 100 %  BP: (129-185)/(60-81) 162/72     Weight: 52.4 kg (115 lb 9.6 oz)  Body mass index is 19.84 kg/m².    Intake/Output Summary (Last 24 hours) at 3/29/2020 1632  Last data filed at 3/29/2020 1400  Gross per 24 hour    Intake 4780 ml   Output --   Net 4780 ml      Physical Exam   Constitutional: She is oriented to person, place, and time. She appears well-developed and well-nourished.   HENT:   Head: Normocephalic and atraumatic.   Eyes: Pupils are equal, round, and reactive to light. EOM are normal.   Neck: Normal range of motion. Neck supple. No tracheal deviation present. No thyromegaly present.   Cardiovascular: Normal rate, regular rhythm and normal heart sounds.   Pulmonary/Chest: She is in respiratory distress. She has rales.   Abdominal: Soft. Bowel sounds are normal. She exhibits no distension. There is no tenderness. There is no rebound and no guarding.   Musculoskeletal: Normal range of motion.   Lymphadenopathy:     She has no cervical adenopathy.   Neurological: She is alert and oriented to person, place, and time.   Skin: Skin is warm and dry. Capillary refill takes less than 2 seconds.   Psychiatric: She has a normal mood and affect. Her behavior is normal. Judgment and thought content normal.   Nursing note and vitals reviewed.      Significant Labs:   Recent Lab Results       03/29/20  0734        Albumin 3.2     Alkaline Phosphatase 68     ALT 58     Anion Gap 12     AST 64     Baso # 0.03     Basophil% 0.4     BILIRUBIN TOTAL 0.8  Comment:  For infants and newborns, interpretation of results should be based  on gestational age, weight and in agreement with clinical  observations.  Premature Infant recommended reference ranges:  Up to 24 hours.............<8.0 mg/dL  Up to 48 hours............<12.0 mg/dL  3-5 days..................<15.0 mg/dL  6-29 days.................<15.0 mg/dL       BNP 1,381  Comment:  Values of less than 100 pg/ml are consistent with non-CHF populations.     BUN, Bld 24     Calcium 8.4     Chloride 94     CO2 30     Creatinine 1.1     Differential Method Automated     eGFR if African American 49.7     eGFR if non  43.1  Comment:  Calculation used to obtain the estimated  glomerular filtration  rate (eGFR) is the CKD-EPI equation.        Eos # 0.1     Eosinophil% 0.9     Glucose 82     Gran # (ANC) 5.3     Gran% 75.1     Hematocrit 39.4     Hemoglobin 12.5     Immature Grans (Abs) 0.01  Comment:  Mild elevation in immature granulocytes is non specific and   can be seen in a variety of conditions including stress response,   acute inflammation, trauma and pregnancy. Correlation with other   laboratory and clinical findings is essential.       Immature Granulocytes 0.1     Lymph # 1.1     Lymph% 15.0     Magnesium 1.9     MCH 28.2     MCHC 31.7     MCV 89     Mono # 0.6     Mono% 8.5     MPV 11.5     nRBC 0     Phosphorus 3.4     Platelets 149     Potassium 3.2     PROTEIN TOTAL 5.6     RBC 4.43     RDW 17.1     Sodium 136     WBC 7.02         All pertinent labs within the past 24 hours have been reviewed.    Significant Imaging: I have reviewed and interpreted all pertinent imaging results/findings within the past 24 hours.      Assessment/Plan:      * Acute on chronic congestive heart failure  IV lasix 40 mg daily, consider increasing based on clinical appearance and CXR  BNP near 3000  Strict I/O  Daily weights   Continuous O2 monitoring    3/26/20  Continue on NRB O2, wean as tolerated  IV lasix 40 mg daily, may need to increase frequency  Trend BNP  Strict I/O  Started gentle intravascular hydration, monitor closely for fluid overload    3/27/20  Echo ordered but will hold until COVID testing results unless imaging becomes emergent  Continue IV fluids at low rate  Daily lasix  Strict I/O    03/28/2020:  Echocardiogram is pending  Continue diuresis  Repeat BNP in the a.m  Monitor renal function closely.    03/29/2020:  Follow-up echocardiogram when done in the a.m..  BNP improving at 1381  Patient is asymptomatic at this point  Repeat labs in the a.m. to include CBC and CMP  Monitor intake and output  If stable possible discharge to home tomorrow        SOB (shortness of  breath)  Possibly 2/2 acute on chronic heart failure  Diuresis in progress, monitor output      Pneumonia of right lower lobe due to infectious organism  R/o COVID  Zithromax 500 mg IV + Rocephin 1g q24h  WBC wnl  Clinically c/w decompensated heart failure, will diuresis and monitor O2 sats  Consider changing abx in AM if worsening vitals or she develops fever overnight  Holding IVFs at this time but will monitor BP and start if hypotension develops  Hold steroids at this time in setting of possible COVID    3/26/20  COVID pending  Continue current antibiotics  WBC remains wnl    3/27/20  Difficulty obtaining oxygen saturation readings, will monitor clinically.   Continue antibiotics          VTE Risk Mitigation (From admission, onward)         Ordered     heparin (porcine) injection 5,000 Units  Every 8 hours      03/26/20 1852     IP VTE HIGH RISK PATIENT  Once      03/26/20 1852     Place JUANA hose  Until discontinued      03/26/20 1852                      Manolo Meyer MD  Department of Hospital Medicine   [facility]

## 2020-03-30 VITALS
HEART RATE: 83 BPM | RESPIRATION RATE: 17 BRPM | DIASTOLIC BLOOD PRESSURE: 62 MMHG | OXYGEN SATURATION: 100 % | TEMPERATURE: 97 F | HEIGHT: 64 IN | WEIGHT: 115.63 LBS | SYSTOLIC BLOOD PRESSURE: 146 MMHG | BODY MASS INDEX: 19.74 KG/M2

## 2020-03-30 LAB
ALBUMIN SERPL BCP-MCNC: 3.2 G/DL (ref 3.5–5.2)
ALP SERPL-CCNC: 67 U/L (ref 55–135)
ALT SERPL W/O P-5'-P-CCNC: 55 U/L (ref 10–44)
ANION GAP SERPL CALC-SCNC: 12 MMOL/L (ref 8–16)
AST SERPL-CCNC: 53 U/L (ref 10–40)
BASOPHILS # BLD AUTO: 0.03 K/UL (ref 0–0.2)
BASOPHILS NFR BLD: 0.5 % (ref 0–1.9)
BILIRUB SERPL-MCNC: 1.1 MG/DL (ref 0.1–1)
BNP SERPL-MCNC: 2214 PG/ML (ref 0–99)
BUN SERPL-MCNC: 20 MG/DL (ref 10–30)
CALCIUM SERPL-MCNC: 8.3 MG/DL (ref 8.7–10.5)
CHLORIDE SERPL-SCNC: 93 MMOL/L (ref 95–110)
CO2 SERPL-SCNC: 30 MMOL/L (ref 23–29)
CREAT SERPL-MCNC: 0.9 MG/DL (ref 0.5–1.4)
DIFFERENTIAL METHOD: ABNORMAL
EOSINOPHIL # BLD AUTO: 0.1 K/UL (ref 0–0.5)
EOSINOPHIL NFR BLD: 2 % (ref 0–8)
ERYTHROCYTE [DISTWIDTH] IN BLOOD BY AUTOMATED COUNT: 17.2 % (ref 11.5–14.5)
EST. GFR  (AFRICAN AMERICAN): >60 ML/MIN/1.73 M^2
EST. GFR  (NON AFRICAN AMERICAN): 54.9 ML/MIN/1.73 M^2
GLUCOSE SERPL-MCNC: 97 MG/DL (ref 70–110)
HCT VFR BLD AUTO: 41.7 % (ref 37–48.5)
HGB BLD-MCNC: 13.1 G/DL (ref 12–16)
IMM GRANULOCYTES # BLD AUTO: 0.02 K/UL (ref 0–0.04)
IMM GRANULOCYTES NFR BLD AUTO: 0.3 % (ref 0–0.5)
LYMPHOCYTES # BLD AUTO: 1.2 K/UL (ref 1–4.8)
LYMPHOCYTES NFR BLD: 17.6 % (ref 18–48)
MAGNESIUM SERPL-MCNC: 1.8 MG/DL (ref 1.6–2.6)
MCH RBC QN AUTO: 28.1 PG (ref 27–31)
MCHC RBC AUTO-ENTMCNC: 31.4 G/DL (ref 32–36)
MCV RBC AUTO: 90 FL (ref 82–98)
MONOCYTES # BLD AUTO: 0.6 K/UL (ref 0.3–1)
MONOCYTES NFR BLD: 8.5 % (ref 4–15)
NEUTROPHILS # BLD AUTO: 4.7 K/UL (ref 1.8–7.7)
NEUTROPHILS NFR BLD: 71.1 % (ref 38–73)
NRBC BLD-RTO: 0 /100 WBC
PHOSPHATE SERPL-MCNC: 2.6 MG/DL (ref 2.7–4.5)
PLATELET # BLD AUTO: 164 K/UL (ref 150–350)
PMV BLD AUTO: 10.9 FL (ref 9.2–12.9)
POTASSIUM SERPL-SCNC: 3.7 MMOL/L (ref 3.5–5.1)
PROT SERPL-MCNC: 5.5 G/DL (ref 6–8.4)
RBC # BLD AUTO: 4.66 M/UL (ref 4–5.4)
SODIUM SERPL-SCNC: 135 MMOL/L (ref 136–145)
WBC # BLD AUTO: 6.59 K/UL (ref 3.9–12.7)

## 2020-03-30 PROCEDURE — 94760 N-INVAS EAR/PLS OXIMETRY 1: CPT

## 2020-03-30 PROCEDURE — 80053 COMPREHEN METABOLIC PANEL: CPT

## 2020-03-30 PROCEDURE — 27000221 HC OXYGEN, UP TO 24 HOURS

## 2020-03-30 PROCEDURE — 84100 ASSAY OF PHOSPHORUS: CPT

## 2020-03-30 PROCEDURE — 99239 PR HOSPITAL DISCHARGE DAY,>30 MIN: ICD-10-PCS | Mod: ,,, | Performed by: INTERNAL MEDICINE

## 2020-03-30 PROCEDURE — 85025 COMPLETE CBC W/AUTO DIFF WBC: CPT

## 2020-03-30 PROCEDURE — 63600175 PHARM REV CODE 636 W HCPCS: Performed by: FAMILY MEDICINE

## 2020-03-30 PROCEDURE — 83735 ASSAY OF MAGNESIUM: CPT

## 2020-03-30 PROCEDURE — 83880 ASSAY OF NATRIURETIC PEPTIDE: CPT

## 2020-03-30 PROCEDURE — 36415 COLL VENOUS BLD VENIPUNCTURE: CPT

## 2020-03-30 PROCEDURE — 99239 HOSP IP/OBS DSCHRG MGMT >30: CPT | Mod: ,,, | Performed by: INTERNAL MEDICINE

## 2020-03-30 RX ORDER — POTASSIUM CHLORIDE 750 MG/1
20 CAPSULE, EXTENDED RELEASE ORAL DAILY
Qty: 30 CAPSULE | Refills: 2 | Status: SHIPPED | OUTPATIENT
Start: 2020-03-30

## 2020-03-30 RX ORDER — FUROSEMIDE 20 MG/1
40 TABLET ORAL DAILY
Qty: 60 TABLET | Refills: 5 | Status: SHIPPED | OUTPATIENT
Start: 2020-03-30

## 2020-03-30 RX ADMIN — HEPARIN SODIUM 5000 UNITS: 5000 INJECTION, SOLUTION INTRAVENOUS; SUBCUTANEOUS at 06:03

## 2020-03-30 RX ADMIN — FUROSEMIDE 40 MG: 10 INJECTION, SOLUTION INTRAMUSCULAR; INTRAVENOUS at 09:03

## 2020-03-30 NOTE — NURSING
PT GIVEN SAURAV CARE/BRIEF PLACED ON PT/BOTH IVHL'S DC'D/DRSG AND PRESSURE HELD x3 MINUTES. PTS GOWN CHANGED/PT ASSISTED UP TO WC OF [facility] AND O2 TUBING CONNECTED TO THEIR O2 TANK FOR PTS TRANSPORT TO FACILITY.

## 2020-03-30 NOTE — PLAN OF CARE
[facility]    FACILITY TRANSFER ORDERS      Patient Name: Bebe Gomez  YOB: 1925    PCP: José Miguel Acuna Ii, MD   PCP Address: [address] [place] [id number]  PCP Phone Number: [phone]  PCP Fax: [phone]    Encounter Date: 03/30/2020    Admit to: [facility]    Vital Signs:  Routine    Diagnoses:   Active Hospital Problems    Diagnosis  POA    *Acute hypoxemic respiratory failure [J96.01]  Yes    Acute on chronic congestive heart failure [I50.9]  Yes     Priority: 2     Pneumonia of right lower lobe due to infectious organism [J18.1]  Yes    SOB (shortness of breath) [R06.02]  Yes    Hyperkalemia [E87.5]  Yes    Elevated serum creatinine [R79.89]  Yes    Hyponatremia [E87.1]  Yes      Resolved Hospital Problems   No resolved problems to display.       Allergies:  Review of patient's allergies indicates:   Allergen Reactions    Antisera     Diphtheria toxin preparations     Horse/equine containing products     Klonopin [clonazepam]     Pertussis vaccines     Soma [carisoprodol]     Sulfa (sulfonamide antibiotics)     Tetanus vaccines and toxoid        Diet: cardiac diet    Activities: Activity as tolerated    Nursing: Routine     Labs: CBC and BMP Once     CONSULTS:    Physical Therapy to evaluate and treat.     MISCELLANEOUS CARE:  Routine Skin for Bedridden Patients: Apply moisture barrier cream to all skin folds and wet areas in perineal area daily and after baths and all bowel movements.    WOUND CARE ORDERS  None    Medications: Review discharge medications with patient and family and provide education.      Current Discharge Medication List      START taking these medications    Details   potassium chloride (MICRO-K) 10 MEQ CpSR Take 2 capsules (20 mEq total) by mouth once daily.  Qty: 30 capsule, Refills: 2         CONTINUE these medications which have CHANGED    Details   furosemide (LASIX) 20 MG tablet Take 2 tablets  (40 mg total) by mouth once daily.  Qty: 60 tablet, Refills: 5         CONTINUE these medications which have NOT CHANGED    Details   amLODIPine (NORVASC) 2.5 MG tablet Take 2.5 mg by mouth once daily.      atenoloL (TENORMIN) 25 MG tablet Take 25 mg by mouth once daily.      atorvastatin (LIPITOR) 40 MG tablet Take 40 mg by mouth once daily.      citalopram (CELEXA) 10 MG tablet Take 10 mg by mouth once daily.      isosorbide mononitrate (IMDUR) 30 MG 24 hr tablet Take 15 mg by mouth once daily.      levothyroxine (SYNTHROID) 75 MCG tablet Take 75 mcg by mouth before breakfast.      loratadine (CLARITIN) 10 mg tablet Take 10 mg by mouth once daily.      losartan (COZAAR) 50 MG tablet Take 50 mg by mouth once daily. Hold for SBP<90 or pulse <60      montelukast (SINGULAIR) 10 mg tablet Take 10 mg by mouth every evening.      omeprazole (PRILOSEC) 20 MG capsule Take 20 mg by mouth once daily.      potassium chloride (KLOR-CON) 10 MEQ TbSR Take 10 mEq by mouth once.                  _________________________________  Manolo Meyer MD  03/30/2020

## 2020-03-30 NOTE — ASSESSMENT & PLAN NOTE
03/30/2020 potassium trended back to normal a 3.7.  This was thought to be related to volume contraction.  Patient will be sent back on Lasix 40 mg p.o. daily with potassium supplement 20 mEq p.o. daily.

## 2020-03-30 NOTE — PLAN OF CARE
Problem: Adult Inpatient Plan of Care  Goal: Plan of Care Review  Outcome: Ongoing, Progressing  Goal: Optimal Comfort and Wellbeing  Outcome: Ongoing, Progressing

## 2020-03-30 NOTE — ASSESSMENT & PLAN NOTE
03/30/2020  Creatinine back to normal  Monitor labs at the nursing home for BUN creatinine due to regular dose of Lasix

## 2020-03-30 NOTE — ASSESSMENT & PLAN NOTE
IV lasix 40 mg daily, consider increasing based on clinical appearance and CXR  BNP near 3000  Strict I/O  Daily weights   Continuous O2 monitoring    3/26/20  Continue on NRB O2, wean as tolerated  IV lasix 40 mg daily, may need to increase frequency  Trend BNP  Strict I/O  Started gentle intravascular hydration, monitor closely for fluid overload    3/27/20  Echo ordered but will hold until COVID testing results unless imaging becomes emergent  Continue IV fluids at low rate  Daily lasix  Strict I/O    03/28/2020:  Echocardiogram is pending  Continue diuresis  Repeat BNP in the a.m  Monitor renal function closely.    03/29/2020:  Follow-up echocardiogram when done in the a.m..  BNP improving at 1381  Patient is asymptomatic at this point  Repeat labs in the a.m. to include CBC and CMP  Monitor intake and output  If stable possible discharge to home tomorrow    03/30/2020  Discharged to [facility]  Continue regular dose of Lasix at 40 mg p.o. daily  Potassium 20 mEq p.o. daily  Continue other home medications at the nursing home as previously prescribed  Follow-up with primary care physician within 1 week

## 2020-03-30 NOTE — PLAN OF CARE
03/30/20 1539   Final Note   Assessment Type Final Discharge Note   Anticipated Discharge Disposition Long Term   What phone number can be called within the next 1-3 days to see how you are doing after discharge? [phone]   Hospital Follow Up  Appt(s) scheduled?   (PT IS FOLLOWED BY MAEGAN SCHMITZ AT NURSING HOME)   Discharge plans and expectations educations in teach back method with documentation complete? Yes   Post-Acute Status   Post-Acute Authorization Placement   Post-Acute Placement Status Set-up Complete   Discharge Delays None known at this time   PT IS DISCHARGED TO RETURN TO LONG TERM CARE AT [facility] WHERE SHE HAS LIVED FOR YEARS.  PT IS NEGATIVE FOR COVID 19. SW HAS NOTIFIED DAUGHTER THAT PT IS RETURNING. [facility] WILL TRANSPORT PT. ALL ARRANGEMENTS FINALIZED FOR PT'S READMISSION. NO OTHER DISCHARGE NEEDS IDENTIFIED AT THIS TIME.

## 2020-03-30 NOTE — DISCHARGE SUMMARY
[facility] Hospital Medicine  Discharge Summary      Patient Name: Bebe Gomez  MRN: [id number]  Admission Date: 3/26/2020  Hospital Length of Stay: 4 days  Discharge Date and Time: 3/30/2020     Attending Physician: Manolo Meyer MD   Discharging Provider: Manolo Meyer MD  Primary Care Provider: José Miguel Acuna Ii, MD      HPI:   Patient is a 89-year-old female with unknown past medical history presents to the ER from nursing home with worsening shortness of breath, hypoxia.  On arrival in the ER O2 sat room the 70s, mottled skin appearance with lower extremity edema.  White blood cell count within normal limits.  Chest x-ray with possible pleural effusion versus bilateral patchy infiltrates.  Patient is oriented to name.  In the ER she was given IV Lasix 60, tested for COVID 19, started on continuous O2 by face mask, given Zithromax 500 mg IV. Hospital called for admission for acute respiratory failure with hypoxia.     * No surgery found *      Hospital Course:   Remained on nonrebreather overnight. Improving clinically. BNP pending. Respiratory rate improving. VSS. IVFs started at low rate for gentle hydration. Continue lasix daily.     03/28/2020:  Patient is sitting up eating and tolerating her diet.  Patient is on nasal cannula now at 4 L nasal cannula.  BNP was 1810 phosphorus 4.0, sodium 134 potassium 3.7 chloride 95 bicarb 28 glucose 87 BUN 33 creatinine 1.6 calcium 8.3 and GFR 31.6.  White blood cell count normal with CBC normal and differential normal.  COVID 19 virus not detected.  Patient is clinically improving and will continue treating congestive heart failure.    03/29/2020:  Patient is sitting up and comfortable without shortness of breath.  Vital signs this morning show a blood pressure 185/81 pulse 73 respirations 18 temperature is afebrile 97.2 and 95% saturations.  Magnesium and phosphorus are normal sodium 136 potassium 3.2 chloride 94 bicarb 30  [facility]  Orthopedic Surgery  Progress Note    Gaby Lanza Patient Status:  Inpatient    1930 MRN [id number]   [facility] 3SW-A Attending Sonia Chiu MD   Hosp Day # 2 PCP Tennille Craven MD     SUBJECTIVE:  INT AST 64 ALT 58 and GFR 49.7.  CBC white blood cell count 7.0 hemoglobin 12.5 hematocrit 39.4 platelets 149 and differential shows 75 granulocytes 15 lymphocytes     03/30/2020:  Patient is is continue be comfortable and no shortness of breath overnight.  136/65 pulse 94 respirations 18 and patient has been afebrile at 965 O2 sats were %.  BNP was 2200 this morning however patient is clinically much improved.  Phosphorus is 2.6 sodium 135 BUN creatinine were 20 and 0.9 AST 53 ALT 55 and GFR greater than 60.  White blood cell count 6.5 hemoglobin 13.1 hematocrit 42 and differential was normal.  Patient will be sent back to [facility] for continued recuperation.  The patient will be started on a regular dose of Lasix at 40 mg p.o. daily with supplement of potassium 20 mEq p.o. daily.  Patient will continue all other home medications as previously prescribed.  Patient will be weighed daily and follow by symptoms of skin just of heart failure.  Patient is pleasant sitting up eating and tolerating her diet and I spoke with her family which is her daughter who was made aware of plans and she was in agreement     Consults:   Consults (From admission, onward)        Status Ordering Provider     IP consult to case management  Once     Provider:  (Not yet assigned)    Acknowledged GISSELLE COOK          Acute on chronic congestive heart failure  IV lasix 40 mg daily, consider increasing based on clinical appearance and CXR  BNP near 3000  Strict I/O  Daily weights   Continuous O2 monitoring    3/26/20  Continue on NRB O2, wean as tolerated  IV lasix 40 mg daily, may need to increase frequency  Trend BNP  Strict I/O  Started gentle intravascular hydration, monitor closely for fluid overload    3/27/20  Echo ordered but will hold until COVID testing results unless imaging becomes emergent  Continue IV fluids at low rate  Daily lasix  Strict I/O    03/28/2020:  Echocardiogram is pending  Continue  diuresis  Repeat BNP in the a.m  Monitor renal function closely.    03/29/2020:  Follow-up echocardiogram when done in the a.m..  BNP improving at 1381  Patient is asymptomatic at this point  Repeat labs in the a.m. to include CBC and CMP  Monitor intake and output  If stable possible discharge to home tomorrow    03/30/2020  Discharged to [facility]  Continue regular dose of Lasix at 40 mg p.o. daily  Potassium 20 mEq p.o. daily  Continue other home medications at the nursing home as previously prescribed  Follow-up with primary care physician within 1 week        Elevated serum creatinine  03/30/2020  Creatinine back to normal  Monitor labs at the [facility] home for BUN creatinine due to regular dose of Lasix      Hyperkalemia  03/30/2020 potassium trended back to normal a 3.7.  This was thought to be related to volume contraction.  Patient will be sent back on Lasix 40 mg p.o. daily with potassium supplement 20 mEq p.o. daily.        Final Active Diagnoses:    Diagnosis Date Noted POA    PRINCIPAL PROBLEM:  Acute hypoxemic respiratory failure [J96.01] 03/26/2020 Yes    Acute on chronic congestive heart failure [I50.9] 03/26/2020 Yes    Pneumonia of right lower lobe due to infectious organism [J18.1] 03/26/2020 Yes    SOB (shortness of breath) [R06.02] 03/26/2020 Yes    Hyperkalemia [E87.5] 03/26/2020 Yes    Elevated serum creatinine [R79.89] 03/26/2020 Yes    Hyponatremia [E87.1] 03/26/2020 Yes      Problems Resolved During this Admission:       Discharged Condition: Stable    Disposition: To [facility]     Follow Up:    Patient Instructions:   No discharge procedures on file.    Significant Diagnostic Studies: Labs: All labs within the past 24 hours have been reviewed    Pending Diagnostic Studies:     None         Medications:  Reconciled Home Medications:      Medication List      CHANGE how you take these medications    furosemide 20 MG tablet  Commonly known as:  LASIX  Take  2 tablets (40 mg total) by mouth once daily.  What changed:    · how much to take  · when to take this     * potassium chloride 10 MEQ Tbsr  Commonly known as:  KLOR-CON  Take 10 mEq by mouth once.  What changed:  Another medication with the same name was added. Make sure you understand how and when to take each.     * potassium chloride 10 MEQ Cpsr  Commonly known as:  MICRO-K  Take 2 capsules (20 mEq total) by mouth once daily.  What changed:  You were already taking a medication with the same name, and this prescription was added. Make sure you understand how and when to take each.         * This list has 2 medication(s) that are the same as other medications prescribed for you. Read the directions carefully, and ask your doctor or other care provider to review them with you.            CONTINUE taking these medications    amLODIPine 2.5 MG tablet  Commonly known as:  NORVASC  Take 2.5 mg by mouth once daily.     atenoloL 25 MG tablet  Commonly known as:  TENORMIN  Take 25 mg by mouth once daily.     atorvastatin 40 MG tablet  Commonly known as:  LIPITOR  Take 40 mg by mouth once daily.     citalopram 10 MG tablet  Commonly known as:  CELEXA  Take 10 mg by mouth once daily.     isosorbide mononitrate 30 MG 24 hr tablet  Commonly known as:  IMDUR  Take 15 mg by mouth once daily.     levothyroxine 75 MCG tablet  Commonly known as:  SYNTHROID  Take 75 mcg by mouth before breakfast.     loratadine 10 mg tablet  Commonly known as:  CLARITIN  Take 10 mg by mouth once daily.     losartan 50 MG tablet  Commonly known as:  COZAAR  Take 50 mg by mouth once daily. Hold for SBP<90 or pulse <60     montelukast 10 mg tablet  Commonly known as:  SINGULAIR  Take 10 mg by mouth every evening.     omeprazole 20 MG capsule  Commonly known as:  PRILOSEC  Take 20 mg by mouth once daily.            Indwelling Lines/Drains at time of discharge:   Lines/Drains/Airways     None                 Time spent on the discharge of patient: 40  minutes  Patient was seen and examined on the date of discharge and determined to be suitable for discharge.         Manolo Meyer MD  Department of Hospital Medicine  [facility]

## 2020-04-02 NOTE — PHYSICIAN QUERY
"PT Name: Bebe Gomez  MR #: [id number]    Physician Query Form - Heart  Condition Clarification     CDS/: Brielle CORLEY     Contact information: [email or web address]  This form is a permanent document in the medical record.     Query Date: April 2, 2020    By submitting this query, we are merely seeking further clarification of documentation. Please utilize your independent clinical judgment when addressing the question(s) below.    The medical record contains the following   Indicators     Supporting Clinical Findings Location in Medical Record   X BNP 3/26 = 2945  3/28 = 1810  3/29 = 1381  3/30 = 2214 Labs    EF     X Radiology findings Chest xray 3/26  Abnormal chest radiograph as detailed above.  Differential considerations include cardiogenic/noncardiogenic pulmonary edema, aspiration, and multifocal pneumonia.    Chest xray 3/29  Partial clearing right lung base compared to the prior exam.  Suggesting decreasing pleural effusion and decreasing infiltrate/atelectasis.  Left basilar aeration also appearing slightly improved.   Radiologgy    Echo Results 03/29/2020:  Follow-up echocardiogram when done in the a.m..  BNP improving at 1381  Patient is asymptomatic at this point   Discharge summary  Manolo Meyer MD at 3/30/2020  1:58 PM     "Ascites" documented     X "SOB" or "FAIRBANKS" documented Mild respiratory distress, not diaphoretic  Increased work of breathing, tachypnea, physical exam difficult due to patient condition, bibasilar crackles    [facility] medicine H&P 3/26 705 pm KAYLYNN Olea MD   X "Hypoxia" documented  On arrival in the ER O2 sat room the [address] [place] [address] medicine H&P 3/26 705 pm KAYLYNN Olea MD   X Heart Failure documented Acute on chronic congestive heart failure  IV lasix 40 mg daily, consider increasing based on clinical appearance and CXR  BNP near 3000  Strict I/O  Daily weights   Continuous O2 monitoring   Discharge summary 3/30 158 pm  KAYLYNN Fabian MD   X "Edema" documented  On " arrival in the ER mottled skin appearance with lower extremity edema.    [facility] medicine H&P 3/26 705 pm KAYLYNN Olea MD   X Diuretics/Meds Furosemide 60mg IV 3/26 once in ER  Furosemide 40 mg IV daily 3/27 Medication sheets    Treatment:      Other:      Heart failure (HF) can be acute, chronic or both. It is generally further specificed as systolic, diastolic, or combined. Lastly, it is important to identify an underlying etiology if known or suspected.     Common clues to acute exacerbation:  Rapidly progressive symptoms (w/in 2 weeks of presentation), using IV diuretics to treat, using supplemental O2, pulmonary edema on Xray, MI w/in 4 weeks, and/or BNP >500    Systolic Heart Failure: is defined as chart documentation of a left ventricular ejection fraction (LVEF) less than 40%     Diastolic Heart Failure: is defined as a left ventricular ejection fraction (LVEF) greater than 40%   +      Evidence of diastolic dysfunction on echocardiography OR    Right heart catheterization wedge pressure above 12 mm Hg OR    Left heart catheterization left ventricular end diastolic pressure 18 mm Hg or above.    References: *American Heart Association    The clinical guidelines noted below are only system guidelines, and do not replace the providers clinical judgment.     Provider, please specify the diagnosis associated with above clinical findings  [  x ] Acute on Chronic Systolic Heart Failure- Pre-existing systolic HF diagnosis.  EF < 40%  and acute HF symptoms documented   [   ] Acute on Chronic Diastolic Heart Failure -    Pre-existing diastoic HF diagnosis.  EF > 40%  and acute HF symptoms documented       [   ] Acute on Chronic Combined Systolic and Diastolic Heart Failure        [   ] Other (please specify):     [  ] Clinically Undetermined                           Please document in your progress notes daily for the duration of treatment until resolved and include in your discharge summary.

## 2023-05-03 NOTE — ED NOTES
Multiple attempts to  pulse ox, unsuccessful, attempts continues    Labs/Imaging Studies/Medications